# Patient Record
Sex: MALE | Race: WHITE | Employment: FULL TIME | URBAN - METROPOLITAN AREA
[De-identification: names, ages, dates, MRNs, and addresses within clinical notes are randomized per-mention and may not be internally consistent; named-entity substitution may affect disease eponyms.]

---

## 2018-08-08 ENCOUNTER — OFFICE VISIT (OUTPATIENT)
Dept: SURGERY | Age: 43
End: 2018-08-08

## 2018-08-08 VITALS
DIASTOLIC BLOOD PRESSURE: 84 MMHG | OXYGEN SATURATION: 97 % | RESPIRATION RATE: 18 BRPM | HEIGHT: 71 IN | HEART RATE: 89 BPM | WEIGHT: 245 LBS | TEMPERATURE: 98.3 F | BODY MASS INDEX: 34.3 KG/M2 | SYSTOLIC BLOOD PRESSURE: 130 MMHG

## 2018-08-08 DIAGNOSIS — K57.32 DIVERTICULITIS OF COLON: Primary | ICD-10-CM

## 2018-08-08 RX ORDER — FENOFIBRATE 145 MG/1
TABLET, COATED ORAL DAILY
COMMUNITY

## 2018-08-08 RX ORDER — ESZOPICLONE 3 MG/1
3 TABLET, FILM COATED ORAL
COMMUNITY
End: 2018-10-08

## 2018-08-08 NOTE — PROGRESS NOTES
Surgery Consult    Subjective:        Rere Green is a 37 y.o.  male who was referred by Dr April Charles for evaluation of diverticulitis. He has had multiple attacks of diverticulitis, usually requiring PO antibiotics. His last attack was severe enough to require hospitalization for a micro-perforated diverticulitis. He is ready to have that section of his intestine removed just like his mother and sister had done for diverticulitis. Chief Complaint   Patient presents with   [facility] Follow Up     Recently in [facility] for 'ruptured diverticulitis'       Patient Active Problem List    Diagnosis Date Noted    Diverticulitis of colon 08/08/2018     Past Medical History:   Diagnosis Date    Acid indigestion     Back pain     H/O diverticulitis of colon     Joint pain     and stiffness      No past surgical history on file. Social History   Substance Use Topics    Smoking status: Current Every Day Smoker    Smokeless tobacco: Never Used    Alcohol use No      Family History   Problem Relation Age of Onset    Cancer Mother       Current Outpatient Prescriptions   Medication Sig    ibuprofen (MOTRIN PO) Take  by mouth. '800mg'    fenofibrate nanocrystallized (TRICOR) 145 mg tablet Take  by mouth daily.  eszopiclone (LUNESTA) 3 mg tablet Take  by mouth nightly.  OMEPRAZOLE PO Take  by mouth. No current facility-administered medications for this visit. No Known Allergies     Review of Systems:    A comprehensive review of systems was negative except for that written in the History of Present Illness.     Objective:     Visit Vitals    /84 (BP 1 Location: Left arm, BP Patient Position: Sitting)    Pulse 89    Temp 98.3 °F (36.8 °C) (Oral)    Resp 18    Ht 5' 11\" (1.803 m)    Wt 245 lb (111.1 kg)    SpO2 97%    BMI 34.17 kg/m2         Physical Exam:  General appearance: alert, cooperative, no distress, appears stated age  Head: Normocephalic, without obvious abnormality, atraumatic  Lungs: clear to auscultation bilaterally  Heart: regular rate and rhythm, S1, S2 normal, no murmur, click, rub or gallop  Neurologic: Grossly normal  Abdomen: soft, and there is no tenderness even in the LLQ at this time. Assessment:       ICD-10-CM ICD-9-CM    1. Diverticulitis of colon K57.32 562.11          Plan:     Sigmoid resection would be a good idea. He has seen Dr Jacquie Hicks who raised the question of a colonoscopy to clear the rest of the colon before proceeding with a surgical resection. I think that is a good idea. Dr Jacquie Hicks wants to let his diverticulitis cool off before performing a colonoscopy. Once that is accomplished, we will proceed with a laparoscopic sigmoid resection with ERAS protocol. Written by frank Shanks, as dictated by Hong Jenkins MD.    Total face to face time with patient: 20 minutes. Greater than 50% of the time was spent in counseling. Signed By: Hong Jenkins MD     August 8, 2018

## 2018-08-08 NOTE — PROGRESS NOTES
1. Have you been to the ER, urgent care clinic since your last visit? Hospitalized since your last visit? Joey Robbins 'ruptured intestines'    2. Have you seen or consulted any other health care providers outside of the [address] [place] [address] since your last visit? Include any pap smears or colon screening.  Storm Xiao PCP and Dr. Savage Nixon GI-6/18/18

## 2018-08-08 NOTE — MR AVS SNAPSHOT
[address] [facility] [address] [address] 
[phone] Patient: Lane Back MRN: [id number] Visit Information Date & Time Provider Department Dept. Phone Encounter #  
 8/8/2018 11:40 AM Royal Roberto, [address] [phone] [id number] Upcoming Health Maintenance Date Due Pneumococcal 19-64 Medium Risk (1 of 1 - PPSV23) 5/17/1992 DTaP/Tdap/Td series (1 - Tdap) 5/17/1994 Influenza Age 5 to Adult 8/1/2018 Allergies as of 8/8/2018  Review Complete On: 8/8/2018 By: Royal Roberto MD  
 No Known Allergies Current Immunizations  Never Reviewed No immunizations on file. Not reviewed this visit You Were Diagnosed With   
  
 Codes Comments Diverticulitis of colon    -  Primary ICD-10-CM: K57.32 
ICD-9-CM: 562.11 Vitals BP Pulse Temp Resp Height(growth percentile) Weight(growth percentile) 130/84 (BP 1 Location: Left arm, BP Patient Position: Sitting) 89 98.3 °F (36.8 °C) (Oral) 18 5' 11\" (1.803 m) 245 lb (111.1 kg) SpO2 BMI Smoking Status 97% 34.17 kg/m2 Current Every Day Smoker Vitals History BMI and BSA Data Body Mass Index Body Surface Area  
 34.17 kg/m 2 2.36 m 2 Preferred Pharmacy Pharmacy Name Phone [address] [phone] Your Updated Medication List  
  
   
This list is accurate as of 8/8/18  5:11 PM.  Always use your most recent med list.  
  
  
  
  
 LUNESTA 3 mg tablet Generic drug:  eszopiclone Take  by mouth nightly. MOTRIN PO Take  by mouth. '800mg' OMEPRAZOLE PO Take  by mouth. TRICOR 145 mg tablet Generic drug:  fenofibrate nanocrystallized Take  by mouth daily. Introducing [facility] & HEALTH SERVICES!    
 Mary Mckeon introduces Fort[employer] patient portal. Now you can access parts of your medical record, email your doctor's office, and request medication refills online. 1. In your internet browser, go to [email or web address] 2. Click on the First Time User? Click Here link in the Sign In box. You will see the New Member Sign Up page. 3. Enter your [employer] Access Code exactly as it appears below. You will not need to use this code after youve completed the sign-up process. If you do not sign up before the expiration date, you must request a new code. · [employer] Access Code: [email or web address] Expires: 11/6/2018 11:17 AM 
 
4. Enter the last four digits of your Social Security Number (xxxx) and Date of Birth (mm/dd/yyyy) as indicated and click Submit. You will be taken to the next sign-up page. 5. Create a AuctionPay ID. This will be your AuctionPay login ID and cannot be changed, so think of one that is secure and easy to remember. 6. Create a AuctionPay password. You can change your password at any time. 7. Enter your Password Reset Question and Answer. This can be used at a later time if you forget your password. 8. Enter your e-mail address. You will receive e-mail notification when new information is available in [address] 9. Click Sign Up. You can now view and download portions of your medical record. 10. Click the Download Summary menu link to download a portable copy of your medical information. If you have questions, please visit the Frequently Asked Questions section of the [employer] website. Remember, AuctionPay is NOT to be used for urgent needs. For medical emergencies, dial 911. Now available from your iPhone and Android! Please provide this summary of care documentation to your next provider. Your primary care clinician is listed as NONE. If you have any questions after today's visit, please call [phone].

## 2018-08-31 ENCOUNTER — HOSPITAL ENCOUNTER (OUTPATIENT)
Dept: CT IMAGING | Age: 43
Discharge: HOME | End: 2018-08-31
Attending: SPECIALIST
Payer: OTHER GOVERNMENT

## 2018-08-31 ENCOUNTER — TELEPHONE (OUTPATIENT)
Dept: SURGERY | Age: 43
End: 2018-08-31

## 2018-08-31 DIAGNOSIS — K57.92 DIVERTICULITIS: ICD-10-CM

## 2018-08-31 DIAGNOSIS — R10.30 LOWER ABDOMINAL PAIN: ICD-10-CM

## 2018-08-31 PROCEDURE — 74011636320 HC RX REV CODE- 636/320: Performed by: SPECIALIST

## 2018-08-31 PROCEDURE — 74177 CT ABD & PELVIS W/CONTRAST: CPT

## 2018-08-31 PROCEDURE — 74011000258 HC RX REV CODE- 258: Performed by: SPECIALIST

## 2018-08-31 RX ORDER — SODIUM CHLORIDE 0.9 % (FLUSH) 0.9 %
10 SYRINGE (ML) INJECTION
Status: COMPLETED | OUTPATIENT
Start: 2018-08-31 | End: 2018-08-31

## 2018-08-31 RX ADMIN — IOHEXOL 50 ML: 240 INJECTION, SOLUTION INTRATHECAL; INTRAVASCULAR; INTRAVENOUS; ORAL at 16:54

## 2018-08-31 RX ADMIN — SODIUM CHLORIDE 100 ML: 900 INJECTION, SOLUTION INTRAVENOUS at 16:54

## 2018-08-31 RX ADMIN — IOPAMIDOL 100 ML: 755 INJECTION, SOLUTION INTRAVENOUS at 16:54

## 2018-08-31 RX ADMIN — Medication 10 ML: at 16:54

## 2018-08-31 NOTE — TELEPHONE ENCOUNTER
I returned patients call and he said he has not heard back from us about scheduling the surgery. I found in his chart he is supposed to have a colonoscopy with Dr. Julissa Castrejon on 9/27/18. He said we were supposed to be   getting the records from [facility] about his recent stay in the hospital with Diverticulitis. I see Dr. Pau Cowart had mentioned in his note that the patient will be having the ERAS surgery. I asked patient if he received any info specifically on the ERAS surgery and he said he did not think so. I suggested he make a F/U appt. after the colonoscopy , to go ever records from [facility] and to discuss in detail the ERAS surgery and well as the prep, prescriptions etc for that and to go over results of colonoscopy. Call was transferred up front for scheduling appt.

## 2018-09-26 ENCOUNTER — ANESTHESIA EVENT (OUTPATIENT)
Dept: ENDOSCOPY | Age: 43
End: 2018-09-26
Payer: OTHER GOVERNMENT

## 2018-09-26 NOTE — ANESTHESIA PREPROCEDURE EVALUATION
Anesthetic History No history of anesthetic complications Review of Systems / Medical History Patient summary reviewed, nursing notes reviewed and pertinent labs reviewed Pulmonary Within defined limits Neuro/Psych Within defined limits Cardiovascular Hyperlipidemia GI/Hepatic/Renal 
  
GERD: well controlled Endo/Other Obesity Other Findings Physical Exam 
 
Airway Mallampati: III 
TM Distance: > 6 cm Neck ROM: normal range of motion Mouth opening: Normal 
 
 Cardiovascular Regular rate and rhythm,  S1 and S2 normal,  no murmur, click, rub, or gallop Dental 
No notable dental hx Pulmonary Breath sounds clear to auscultation Abdominal 
GI exam deferred Other Findings Anesthetic Plan ASA: 2 Anesthesia type: MAC Induction: Intravenous Anesthetic plan and risks discussed with: Patient

## 2018-09-27 ENCOUNTER — ANESTHESIA (OUTPATIENT)
Dept: ENDOSCOPY | Age: 43
End: 2018-09-27
Payer: OTHER GOVERNMENT

## 2018-09-27 ENCOUNTER — HOSPITAL ENCOUNTER (OUTPATIENT)
Age: 43
Setting detail: OUTPATIENT SURGERY
Discharge: HOME | End: 2018-09-27
Attending: SPECIALIST | Admitting: SPECIALIST
Payer: OTHER GOVERNMENT

## 2018-09-27 VITALS
RESPIRATION RATE: 14 BRPM | DIASTOLIC BLOOD PRESSURE: 78 MMHG | TEMPERATURE: 98.2 F | HEART RATE: 65 BPM | OXYGEN SATURATION: 95 % | SYSTOLIC BLOOD PRESSURE: 116 MMHG

## 2018-09-27 PROCEDURE — 77030013992 HC SNR POLYP ENDOSC BSC -B: Performed by: SPECIALIST

## 2018-09-27 PROCEDURE — 76060000032 HC ANESTHESIA 0.5 TO 1 HR: Performed by: SPECIALIST

## 2018-09-27 PROCEDURE — 74011250636 HC RX REV CODE- 250/636

## 2018-09-27 PROCEDURE — 88305 TISSUE EXAM BY PATHOLOGIST: CPT | Performed by: SPECIALIST

## 2018-09-27 PROCEDURE — 76040000007: Performed by: SPECIALIST

## 2018-09-27 PROCEDURE — 77030027957 HC TBNG IRR ENDOGTR BUSS -B: Performed by: SPECIALIST

## 2018-09-27 PROCEDURE — 77030009426 HC FCPS BIOP ENDOSC BSC -B: Performed by: SPECIALIST

## 2018-09-27 PROCEDURE — 77030003657 HC NDL SCLER BSC -B: Performed by: SPECIALIST

## 2018-09-27 RX ORDER — PROPOFOL 10 MG/ML
INJECTION, EMULSION INTRAVENOUS AS NEEDED
Status: DISCONTINUED | OUTPATIENT
Start: 2018-09-27 | End: 2018-09-27 | Stop reason: HOSPADM

## 2018-09-27 RX ORDER — ATROPINE SULFATE 0.1 MG/ML
0.5 INJECTION INTRAVENOUS
Status: DISCONTINUED | OUTPATIENT
Start: 2018-09-27 | End: 2018-09-27 | Stop reason: HOSPADM

## 2018-09-27 RX ORDER — SODIUM CHLORIDE 9 MG/ML
INJECTION, SOLUTION INTRAVENOUS
Status: DISCONTINUED | OUTPATIENT
Start: 2018-09-27 | End: 2018-09-27 | Stop reason: HOSPADM

## 2018-09-27 RX ORDER — SODIUM CHLORIDE 0.9 % (FLUSH) 0.9 %
5-10 SYRINGE (ML) INJECTION EVERY 8 HOURS
Status: DISCONTINUED | OUTPATIENT
Start: 2018-09-27 | End: 2018-09-27 | Stop reason: HOSPADM

## 2018-09-27 RX ORDER — NALOXONE HYDROCHLORIDE 0.4 MG/ML
0.4 INJECTION, SOLUTION INTRAMUSCULAR; INTRAVENOUS; SUBCUTANEOUS
Status: DISCONTINUED | OUTPATIENT
Start: 2018-09-27 | End: 2018-09-27 | Stop reason: HOSPADM

## 2018-09-27 RX ORDER — FENTANYL CITRATE 50 UG/ML
200 INJECTION, SOLUTION INTRAMUSCULAR; INTRAVENOUS
Status: DISCONTINUED | OUTPATIENT
Start: 2018-09-27 | End: 2018-09-27 | Stop reason: HOSPADM

## 2018-09-27 RX ORDER — SODIUM CHLORIDE 9 MG/ML
50 INJECTION, SOLUTION INTRAVENOUS CONTINUOUS
Status: DISCONTINUED | OUTPATIENT
Start: 2018-09-27 | End: 2018-09-27 | Stop reason: HOSPADM

## 2018-09-27 RX ORDER — MIDAZOLAM HYDROCHLORIDE 1 MG/ML
.25-1 INJECTION, SOLUTION INTRAMUSCULAR; INTRAVENOUS
Status: DISCONTINUED | OUTPATIENT
Start: 2018-09-27 | End: 2018-09-27 | Stop reason: HOSPADM

## 2018-09-27 RX ORDER — FLUMAZENIL 0.1 MG/ML
0.2 INJECTION INTRAVENOUS
Status: DISCONTINUED | OUTPATIENT
Start: 2018-09-27 | End: 2018-09-27 | Stop reason: HOSPADM

## 2018-09-27 RX ORDER — EPINEPHRINE 0.1 MG/ML
1 INJECTION INTRACARDIAC; INTRAVENOUS
Status: DISCONTINUED | OUTPATIENT
Start: 2018-09-27 | End: 2018-09-27 | Stop reason: HOSPADM

## 2018-09-27 RX ORDER — DEXTROMETHORPHAN/PSEUDOEPHED 2.5-7.5/.8
1.2 DROPS ORAL
Status: DISCONTINUED | OUTPATIENT
Start: 2018-09-27 | End: 2018-09-27 | Stop reason: HOSPADM

## 2018-09-27 RX ORDER — SODIUM CHLORIDE 0.9 % (FLUSH) 0.9 %
5-10 SYRINGE (ML) INJECTION AS NEEDED
Status: DISCONTINUED | OUTPATIENT
Start: 2018-09-27 | End: 2018-09-27 | Stop reason: HOSPADM

## 2018-09-27 RX ADMIN — PROPOFOL 70 MG: 10 INJECTION, EMULSION INTRAVENOUS at 16:28

## 2018-09-27 RX ADMIN — PROPOFOL 50 MG: 10 INJECTION, EMULSION INTRAVENOUS at 16:37

## 2018-09-27 RX ADMIN — PROPOFOL 30 MG: 10 INJECTION, EMULSION INTRAVENOUS at 16:32

## 2018-09-27 RX ADMIN — SODIUM CHLORIDE: 9 INJECTION, SOLUTION INTRAVENOUS at 16:18

## 2018-09-27 RX ADMIN — PROPOFOL 50 MG: 10 INJECTION, EMULSION INTRAVENOUS at 16:34

## 2018-09-27 RX ADMIN — PROPOFOL 50 MG: 10 INJECTION, EMULSION INTRAVENOUS at 16:36

## 2018-09-27 RX ADMIN — SODIUM CHLORIDE: 9 INJECTION, SOLUTION INTRAVENOUS at 16:10

## 2018-09-27 NOTE — DISCHARGE INSTRUCTIONS
Trudy Vaughan  [id number]  1975    COLON DISCHARGE INSTRUCTIONS  Discomfort:  Redness at IV site- apply warm compress to area; if redness or soreness persist- contact your physician  There may be a slight amount of blood passed from the rectum  Gaseous discomfort- walking, belching will help relieve any discomfort  You may not operate a vehicle for 12 hours  You may not engage in an occupation involving machinery or appliances for rest of today  You may not drink alcoholic beverages for at least 12 hours  Avoid making any critical decisions for at least 24 hour  DIET:   Regular diet. - however -  remember your colon is empty and a heavy meal will produce gas. Avoid these foods:  vegetables, fried / greasy foods, carbonated drinks for today. MEDICATIONS:      Regarding Aspirin or Nonsteroidal medications, please see below. ACTIVITY:  You may resume your normal daily activities it is recommended that you spend the remainder of the day resting -  avoid any strenuous activity. CALL M.D. ANY SIGN OF:  Increasing pain, nausea, vomiting  Abdominal distension (swelling)  New increased bleeding (oral or rectal)  Fever (chills)  Pain in chest area  Bloody discharge from nose or mouth  Shortness of breath    You may not  take any Advil, Aspirin, Ibuprofen, Motrin, Aleve, or Goodys for 10 days, ONLY  Tylenol as needed for pain.       Follow-up Instructions:   Call Dr. Mary Martinez  Results of procedure / biopsy in 10 days  Telephone #  [phone]      DISCHARGE SUMMARY from Nurse    The following personal items collected during your admission are returned to you:   Dental Appliance: Dental Appliances: None  Vision:    Hearing Aid:    Jewelry:    Clothing:    Other Valuables:    Valuables sent to safe:

## 2018-09-27 NOTE — ANESTHESIA POSTPROCEDURE EVALUATION
Post-Anesthesia Evaluation and Assessment Patient: Sherri Mcdonald MRN: [id number]  SSN: [id number] YOB: 1975  Age: 37 y.o. Sex: male Cardiovascular Function/Vital Signs Visit Vitals  /56  Pulse 76  Temp 36.8 °C (98.2 °F)  Resp 18  SpO2 94% Patient is status post MAC anesthesia for Procedure(s): 
COLONOSCOPY 
ENDOSCOPIC POLYPECTOMY INJECTION. Nausea/Vomiting: None Postoperative hydration reviewed and adequate. Pain: 
Pain Scale 1: Numeric (0 - 10) (09/27/18 1510) Pain Intensity 1: 0 (09/27/18 1510) Managed Neurological Status: At baseline Mental Status and Level of Consciousness: Arousable Pulmonary Status:  
O2 Device: CO2 nasal cannula (09/27/18 1701) Adequate oxygenation and airway patent Complications related to anesthesia: None Post-anesthesia assessment completed. No concerns Signed By: Stas Turner DO September 27, 2018

## 2018-09-27 NOTE — IP AVS SNAPSHOT
[address] [facility] [address] [place] [address] 
[phone] Patient: Liyah Keith MRN: [id number] About your hospitalization You were admitted on:  September 27, 2018 You last received care in the:  [facility] ENDOSCOPY You were discharged on:  September 27, 2018 Why you were hospitalized Your primary diagnosis was:  Not on File Follow-up Information None Your Scheduled Appointments Monday October 01, 2018  1:20 PM EDT  
ESTABLISHED PATIENT with MD Shaina Mcqueen [phone] ([address] [facility]) [address] [facility] [address] [address]  
[phone] Discharge Orders None A check kelli indicates which time of day the medication should be taken. My Medications CONTINUE taking these medications Instructions Each Dose to Equal  
 Morning Noon Evening Bedtime LUNESTA 3 mg tablet Generic drug:  eszopiclone Your last dose was: Your next dose is: Take  by mouth nightly. MOTRIN PO Your last dose was: Your next dose is: Take  by mouth. '800mg' OMEPRAZOLE PO Your last dose was: Your next dose is: Take  by mouth. TRICOR 145 mg tablet Generic drug:  fenofibrate nanocrystallized Your last dose was: Your next dose is: Take  by mouth daily. Discharge Instructions Liyah Keith 
[id number] 1975 COLON DISCHARGE INSTRUCTIONS Discomfort: 
Redness at IV site- apply warm compress to area; if redness or soreness persist- contact your physician There may be a slight amount of blood passed from the rectum Gaseous discomfort- walking, belching will help relieve any discomfort You may not operate a vehicle for 12 hours You may not engage in an occupation involving machinery or appliances for rest of today You may not drink alcoholic beverages for at least 12 hours Avoid making any critical decisions for at least 24 hour DIET: 
 Regular diet.  however -  remember your colon is empty and a heavy meal will produce gas. Avoid these foods:  vegetables, fried / greasy foods, carbonated drinks for today. MEDICATIONS: 
  
 Regarding Aspirin or Nonsteroidal medications, please see below. ACTIVITY: 
You may resume your normal daily activities it is recommended that you spend the remainder of the day resting -  avoid any strenuous activity. CALL M.D. ANY SIGN OF: Increasing pain, nausea, vomiting Abdominal distension (swelling) New increased bleeding (oral or rectal) Fever (chills) Pain in chest area Bloody discharge from nose or mouth Shortness of breath You may not  take any Advil, Aspirin, Ibuprofen, Motrin, Aleve, or Goodys for 10 days, ONLY  Tylenol as needed for pain. Follow-up Instructions: 
 Call Dr. Nathan Duncan Results of procedure / biopsy in 10 days Telephone #  [phone] DISCHARGE SUMMARY from Nurse The following personal items collected during your admission are returned to you:  
Dental Appliance: Dental Appliances: None Vision:   
Hearing Aid:   
Jewelry:   
Clothing:   
Other Valuables:   
Valuables sent to safe:   
 
 
 
 
  
  
  
Introducing [facility] & HEALTH SERVICES! Skinny Guzman introduces [employer] patient portal. Now you can access parts of your medical record, email your doctor's office, and request medication refills online. 1. In your internet browser, go to [email or web address] 2. Click on the First Time User? Click Here link in the Sign In box. You will see the New Member Sign Up page. 3. Enter your Ref[employer]ktion Access Code exactly as it appears below. You will not need to use this code after youve completed the sign-up process.  If you do not sign up before the expiration date, you must request a new code. · [employer] Access Code: [email or web address] Expires: 11/6/2018 11:17 AM 
 
4. Enter the last four digits of your Social Security Number (xxxx) and Date of Birth (mm/dd/yyyy) as indicated and click Submit. You will be taken to the next sign-up page. 5. Create a [employer] ID. This will be your [employer] login ID and cannot be changed, so think of one that is secure and easy to remember. 6. Create a [employer] password. You can change your password at any time. 7. Enter your Password Reset Question and Answer. This can be used at a later time if you forget your password. 8. Enter your e-mail address. You will receive e-mail notification when new information is available in [address] 9. Click Sign Up. You can now view and download portions of your medical record. 10. Click the Download Summary menu link to download a portable copy of your medical information. If you have questions, please visit the Frequently Asked Questions section of the [employer] website. Remember, [employer] is NOT to be used for urgent needs. For medical emergencies, dial 911. Now available from your iPhone and Android! Introducing Zhen Abel As a [facility] patient, I wanted to make you aware of our electronic visit tool called Zhen Abel. Marely Gill 24/7 allows you to connect within minutes with a medical provider 24 hours a day, seven days a week via a mobile device or tablet or logging into a secure website from your computer. You can access Zhen Abel from anywhere in the [place].  
 
A virtual visit might be right for you when you have a simple condition and feel like you just dont want to get out of bed, or cant get away from work for an appointment, when your regular [facility] provider is not available (evenings, weekends or holidays), or when youre out of town and need minor care. Electronic visits cost only $49 and if the Soriano Rivas 24/Xtraice provider determines a prescription is needed to treat your condition, one can be electronically transmitted to a nearby pharmacy*. Please take a moment to enroll today if you have not already done so. The enrollment process is free and takes just a few minutes. To enroll, please download the Soriano Rivas 24/Xtraice rojelio to your tablet or phone, or visit [email or web address] to enroll on your computer. And, as an [address] [place] [address] patient with a Kanchufang account, the results of your visits will be scanned into your electronic medical record and your primary care provider will be able to view the scanned results. We urge you to continue to see your regular [facility] provider for your ongoing medical care. And while your primary care provider may not be the one available when you seek a Prismatic virtual visit, the peace of mind you get from getting a real diagnosis real time can be priceless. For more information on Prismatic, view our Frequently Asked Questions (FAQs) at [email or web address]. Sincerely, 
 
Jay Sepulveda MD 
Chief Medical Officer [facility] Jorge *:  certain medications cannot be prescribed via Prismatic Providers Seen During Your Hospitalization Provider Specialty Primary office phone Dieudonne Mendoza MD Gastroenterology [phone] Your Primary Care Physician (PCP) Primary Care Physician Office Phone Office Fax NONE ** None ** ** None ** You are allergic to the following No active allergies Recent Documentation Smoking Status Current Every Day Smoker Emergency Contacts Name Discharge Info Relation Home Work Mobile Jossy Chaudhary DISCHARGE CAREGIVER [3] Other Relative [6] [phone] Patient Belongings The following personal items are in your possession at time of discharge: 
  Dental Appliances: None Please provide this summary of care documentation to your next provider. Signatures-by signing, you are acknowledging that this After Visit Summary has been reviewed with you and you have received a copy. Patient Signature:  ____________________________________________________________ Date:  ____________________________________________________________  
  
[facility] Signature:  ____________________________________________________________ Date:  ____________________________________________________________

## 2018-09-27 NOTE — PROCEDURES
[address]  [address]                 Colonoscopy Procedure Note    Indications:   See Preoperative Diagnosis above  Referring Physician: None  Anesthesia/Sedation: MAC anesthesia Propofol  Endoscopist:  Dr. Rohit Martinez  Assistant:  Endoscopy Technician-1: Mirza Shook  Endoscopy RN-1: Riccardo Copeland RN  Preoperative diagnosis: GERD, DIVERTICULITIS  Postoperative diagnosis: 1. Diverticulosis  2. Colon Polyps    Procedure in Detail:  Informed consent was obtained for the procedure, including sedation. Risks of perforation, hemorrhage, adverse drug reaction, and aspiration were discussed. The patient was placed in the left lateral decubitus position. Based on the pre-procedure assessment, including review of the patient's medical history, medications, allergies, and review of systems, he had been deemed to be an appropriate candidate for moderate sedation; he was therefore sedated with the medications listed above. The patient was monitored continuously with ECG tracing, pulse oximetry, blood pressure monitoring, and direct observations. A rectal examination was performed. The [id number] was inserted into the rectum and advanced under direct vision to the cecum, which was identified by the ileocecal valve and appendiceal orifice. The quality of the colonic preparation was good. A careful inspection was made as the colonoscope was withdrawn, including a retroflexed view of the rectum; findings and interventions are described below. Appropriate photodocumentation was obtained. Findings:  Rectum: 3 mm polyp removed with cold biopsy  Sigmoid: moderate diverticulosis; Descending Colon: moderate diverticulosis;  Transverse Colon: 10 mm sessile polyp removed with hot snare after raising a submucosal pillow with 2 ml of normal saline injection.   Ascending Colon: normal  Cecum: normal    Specimens:    colon polyps    EBL: None    Complications: None; patient tolerated the procedure well. Recommendations:     - Await pathology. - If adenoma is present, repeat colonoscopy in 3 years.      - OK to proceed with colon resection from GI standpoint      Signed By: Ana Lewis MD                        September 27, 2018

## 2018-09-27 NOTE — H&P
Colonoscopy History and Physical 
 
 
The patient was seen and examined. Date of last colonoscopy: none, Polyps  No   
 
The airway was assessed and documented. The problem list, past medical history, and medications were reviewed. Patient Active Problem List  
Diagnosis Code  Diverticulitis of colon K57.32 Social History Social History  Marital status: SINGLE Spouse name: N/A  
 Number of children: N/A  
 Years of education: N/A Occupational History  Not on file. Social History Main Topics  Smoking status: Current Every Day Smoker  Smokeless tobacco: Never Used  Alcohol use No  
 Drug use: Not on file  Sexual activity: Not on file Other Topics Concern  Not on file Social History Narrative Past Medical History:  
Diagnosis Date  Acid indigestion  Back pain  H/O diverticulitis of colon  Joint pain   
 and stiffness Prior to Admission Medications Prescriptions Last Dose Informant Patient Reported? Taking? OMEPRAZOLE PO 9/25/2018  Yes No  
Sig: Take  by mouth.  
eszopiclone (LUNESTA) 3 mg tablet 9/20/2018 at Unknown time  Yes Yes Sig: Take  by mouth nightly. fenofibrate nanocrystallized (TRICOR) 145 mg tablet 9/25/2018  Yes No  
Sig: Take  by mouth daily. ibuprofen (MOTRIN PO) 9/25/2018  Yes No  
Sig: Take  by mouth. '800mg' Facility-Administered Medications: None The patient was seen and examined in the endoscopy suite. The airway was assessed and docuemented. The problem list and medications were reviewed. Chief complaint, history of present illness, and review of systems and Past medical History are positive for: Diverticulitis and GERD The heart, lungs and mental status were satisfactory for the administration of sedation and for the procedure. I discussed with the patient the objectives, risks, consequences and alternatives to the procedure. Plan: Endoscopic procedure with sedation Marti Renae MD  
9/27/2018 
4:29 PM

## 2018-09-27 NOTE — PROGRESS NOTES

## 2018-09-27 NOTE — IP AVS SNAPSHOT
Summary of Care Report The Summary of Care report has been created to help improve care coordination. Users with access to [employer] or [address] (Web-based application) may access additional patient information including the Discharge Summary. If you are not currently a [address] user and need more information, please call the number listed below in the [place] [address] section and ask to be connected with Medical Records. Facility Information Name Address Phone [address] [facility] 7 [address] [phone] Patient Information Patient Name Sex PRESTON Ardon ([id number]) Male 1975 Discharge Information Admitting Provider Service Area Unit Jose A Beckman MD /  [facility] Endoscopy / [phone] Discharge Provider Discharge Date/Time Discharge Disposition Destination (none) (none) (none) (none) Patient Language Language ENGLISH [13] Hospital Problems as of 2018  Reviewed: 2018  5:10 PM by Beni Veras MD  
 None Non-Hospital Problems as of 2018  Reviewed: 2018  5:10 PM by Beni Veras MD  
  
  
  
 Class Noted - Resolved Last Modified Active Problems Diverticulitis of colon  2018 - Present 2018 by Aleida Sims Entered by Aleida Sims You are allergic to the following No active allergies Current Discharge Medication List  
  
CONTINUE these medications which have NOT CHANGED Dose & Instructions Dispensing Information Comments LUNESTA 3 mg tablet Generic drug:  eszopiclone Take  by mouth nightly. Refills:  0 MOTRIN PO Take  by mouth. '800mg' Refills:  0  
   
 OMEPRAZOLE PO Take  by mouth. Refills:  0  
   
 TRICOR 145 mg tablet Generic drug:  fenofibrate nanocrystallized Take  by mouth daily. Refills:  0 Surgery Information ID Date/Time Status Primary Surgeon All Procedures Location [id number] 9/27/2018 1500 Unposted Chris Proctor MD COLONOSCOPY 
ENDOSCOPIC POLYPECTOMY INJECTION [facility] ENDOSCOPY Follow-up Information None Discharge Instructions Onofre Tony 
[id number] 1975 COLON DISCHARGE INSTRUCTIONS Discomfort: 
Redness at IV site- apply warm compress to area; if redness or soreness persist- contact your physician There may be a slight amount of blood passed from the rectum Gaseous discomfort- walking, belching will help relieve any discomfort You may not operate a vehicle for 12 hours You may not engage in an occupation involving machinery or appliances for rest of today You may not drink alcoholic beverages for at least 12 hours Avoid making any critical decisions for at least 24 hour DIET: 
 Regular diet.  however -  remember your colon is empty and a heavy meal will produce gas. Avoid these foods:  vegetables, fried / greasy foods, carbonated drinks for today. MEDICATIONS: 
  
 Regarding Aspirin or Nonsteroidal medications, please see below. ACTIVITY: 
You may resume your normal daily activities it is recommended that you spend the remainder of the day resting -  avoid any strenuous activity. CALL M.D. ANY SIGN OF: Increasing pain, nausea, vomiting Abdominal distension (swelling) New increased bleeding (oral or rectal) Fever (chills) Pain in chest area Bloody discharge from nose or mouth Shortness of breath You may not  take any Advil, Aspirin, Ibuprofen, Motrin, Aleve, or Goodys for 10 days, ONLY  Tylenol as needed for pain. Follow-up Instructions: 
 Call Dr. Becka Carrillo Results of procedure / biopsy in 10 days Telephone #  [phone] DISCHARGE SUMMARY from Nurse The following personal items collected during your admission are returned to you:  
Dental Appliance: Dental Appliances: None Vision:   
Hearing Aid:   
Jewelry:   
Clothing:   
Other Valuables:   
Valuables sent to safe:   
 
 
 
 
Chart Review Routing History No Routing History on File

## 2018-09-27 NOTE — ROUTINE PROCESS
Janice See 1975 
[id number] Situation: 
Verbal report received from: MAURO Salomon Procedure: Procedure(s): 
COLONOSCOPY 
ENDOSCOPIC POLYPECTOMY INJECTION Background: 
 
Preoperative diagnosis: GERD, DIVERTICULITIS Postoperative diagnosis: 1. Diverticulosis 2. Colon Polyps :  Dr. Germaine Green Assistant(s): Endoscopy Technician-1: Luis Angel Fox Endoscopy RN-1: Mellissa May RN Specimens:  
ID Type Source Tests Collected by Time Destination 1 : Transverse Colon Polyp Preservative   Will MD Pat 9/27/2018 1651 Pathology 2 : Rectal Polyp Preservative   Jose Stewart MD 9/27/2018 1655 Pathology H. Pylori  no Assessment: 
Intra-procedure medications Anesthesia gave intra-procedure sedation and medications, see anesthesia flow sheet yes Intravenous fluids:  300  NS @ [facility] Awkward Vital signs stable yes Abdominal assessment: round and soft yes Recommendation: 
Discharge patient per MD order yes. Return to floor no Family or Friend : friends Permission to share finding with family or friend no

## 2018-10-01 ENCOUNTER — OFFICE VISIT (OUTPATIENT)
Dept: SURGERY | Age: 43
End: 2018-10-01

## 2018-10-01 VITALS
BODY MASS INDEX: 34.02 KG/M2 | OXYGEN SATURATION: 98 % | HEART RATE: 93 BPM | DIASTOLIC BLOOD PRESSURE: 80 MMHG | TEMPERATURE: 98.1 F | HEIGHT: 71 IN | RESPIRATION RATE: 18 BRPM | SYSTOLIC BLOOD PRESSURE: 128 MMHG | WEIGHT: 243 LBS

## 2018-10-01 DIAGNOSIS — K57.32 DIVERTICULITIS OF COLON: Primary | ICD-10-CM

## 2018-10-01 RX ORDER — METRONIDAZOLE 500 MG/1
TABLET ORAL
Qty: 3 TAB | Refills: 0 | Status: SHIPPED | OUTPATIENT
Start: 2018-10-01 | End: 2018-10-22

## 2018-10-01 RX ORDER — METOCLOPRAMIDE 10 MG/1
TABLET ORAL
Qty: 3 TAB | Refills: 0 | Status: SHIPPED | OUTPATIENT
Start: 2018-10-01 | End: 2018-10-22

## 2018-10-01 RX ORDER — NEOMYCIN SULFATE 500 MG/1
TABLET ORAL
Qty: 6 TAB | Refills: 0 | Status: SHIPPED | OUTPATIENT
Start: 2018-10-01 | End: 2018-10-22

## 2018-10-01 RX ORDER — POLYETHYLENE GLYCOL 3350, SODIUM SULFATE ANHYDROUS, SODIUM BICARBONATE, SODIUM CHLORIDE, POTASSIUM CHLORIDE 236; 22.74; 6.74; 5.86; 2.97 G/4L; G/4L; G/4L; G/4L; G/4L
POWDER, FOR SOLUTION ORAL
Qty: 4000 ML | Refills: 0 | Status: SHIPPED | OUTPATIENT
Start: 2018-10-01 | End: 2018-10-22

## 2018-10-01 NOTE — PROGRESS NOTES
1. Have you been to the ER, urgent care clinic since your last visit? Hospitalized since your last visit? No 
 
2. Have you seen or consulted any other health care providers outside of the [address] [place] [address] since your last visit? Include any pap smears or colon screening. Dr. Jacinda Hernandez 9/27/18 Colonoscopy Patient C/O of cramping type pain since colonoscopy on 9/27/18, stating it is getting a little better.

## 2018-10-01 NOTE — MR AVS SNAPSHOT
[facility] [address] [address] 
[phone] Patient: Madison Churchill MRN: [id number] OGD:5/99/2307 Visit Information Date & Time Provider Department Dept. Phone Encounter #  
 10/1/2018  1:20 PM Bryce Sarabia, [address] [phone] [id number] Follow-up Instructions Routing History Upcoming Health Maintenance Date Due Pneumococcal 19-64 Medium Risk (1 of 1 - PPSV23) 5/17/1992 DTaP/Tdap/Td series (1 - Tdap) 5/17/1994 Influenza Age 5 to Adult 8/1/2018 Allergies as of 10/1/2018  Review Complete On: 10/1/2018 By: Schuyler Zuniga LPN No Known Allergies Current Immunizations  Never Reviewed No immunizations on file. Not reviewed this visit You Were Diagnosed With   
  
 Codes Comments Diverticulitis of colon    -  Primary ICD-10-CM: K57.32 
ICD-9-CM: 562.11 Vitals BP Pulse Temp Resp Height(growth percentile) Weight(growth percentile) 128/80 (BP 1 Location: Left arm, BP Patient Position: Sitting) 93 98.1 °F (36.7 °C) (Oral) 18 5' 11\" (1.803 m) 243 lb (110.2 kg) SpO2 BMI Smoking Status 98% 33.89 kg/m2 Current Every Day Smoker BMI and BSA Data Body Mass Index Body Surface Area  
 33.89 kg/m 2 2.35 m 2 Preferred Pharmacy Pharmacy Name Phone [facility] [phone] Your Updated Medication List  
  
   
This list is accurate as of 10/1/18  2:09 PM.  Always use your most recent med list.  
  
  
  
  
 LUNESTA 3 mg tablet Generic drug:  eszopiclone Take  by mouth nightly. metoclopramide HCl 10 mg tablet Commonly known as:  REGLAN One day prior to surgery take 1 tablet at 11AM, 5PM and 10PM  
  
 metroNIDAZOLE 500 mg tablet Commonly known as:  FLAGYL On the day before surgery take one tablet at 1PM, 2PM and 10PM  
  
 MOTRIN PO Take  by mouth. '800mg' neomycin 500 mg tablet Commonly known as:  Lysle Haynes On the day before surgery take 2 tablets at 1PM, 2PM and 10PM  
  
 OMEPRAZOLE PO Take  by mouth. PEG 3350-Electrolytes 236-22.74-6.74 -5.86 gram suspension Commonly known as:  GO-LYTELY On the day before surgery at 5PM start drinking 1000 ml every hour for 4 hours TRICOR 145 mg tablet Generic drug:  fenofibrate nanocrystallized Take  by mouth daily. Prescriptions Printed Refills  
 metroNIDAZOLE (FLAGYL) 500 mg tablet 0 Sig: On the day before surgery take one tablet at 1PM, 2PM and 10PM  
 Class: Print  
 metoclopramide HCl (REGLAN) 10 mg tablet 0 Sig: One day prior to surgery take 1 tablet at 11AM, 5PM and 10PM  
 Class: Print  
 neomycin (MYCIFRADIN) 500 mg tablet 0 Sig: On the day before surgery take 2 tablets at 1PM, 2PM and 10PM  
 Class: Print PEG 3350-Electrolytes (GO-LYTELY) 236-22.74-6.74 -5.86 gram suspension 0 Sig: On the day before surgery at 5PM start drinking 1000 ml every hour for 4 hours Class: Print Introducing [facility]! [facility] introduces [employer] patient portal. Now you can access parts of your medical record, email your doctor's office, and request medication refills online. 1. In your internet browser, go to [email or web address] 2. Click on the First Time User? Click Here link in the Sign In box. You will see the New Member Sign Up page. 3. Enter your [employer] Access Code exactly as it appears below. You will not need to use this code after youve completed the sign-up process. If you do not sign up before the expiration date, you must request a new code. · [employer] Access Code: [email or web address] Expires: 11/6/2018 11:17 AM 
 
4. Enter the last four digits of your Social Security Number (xxxx) and Date of Birth (mm/dd/yyyy) as indicated and click Submit. You will be taken to the next sign-up page. 5. Create a [employer] ID. This will be your [employer] login ID and cannot be changed, so think of one that is secure and easy to remember. 6. Create a [employer] password. You can change your password at any time. 7. Enter your Password Reset Question and Answer. This can be used at a later time if you forget your password. 8. Enter your e-mail address. You will receive e-mail notification when new information is available in [address] 9. Click Sign Up. You can now view and download portions of your medical record. 10. Click the Download Summary menu link to download a portable copy of your medical information. If you have questions, please visit the Frequently Asked Questions section of the [employer] website. Remember, [employer] is NOT to be used for urgent needs. For medical emergencies, dial 911. Now available from your iPhone and Android! Please provide this summary of care documentation to your next provider. Your primary care clinician is listed as NONE. If you have any questions after today's visit, please call [phone].

## 2018-10-01 NOTE — PROGRESS NOTES
Surgery History and Physical 
 
Subjective:  
  
Yann Virgen is a 37 y.o.  male who presents with his colonoscopy showing no other lesions of note save fir  His sigmoid diverticulitis. He is ready for resection. Patient Active Problem List  
 Diagnosis Date Noted  Diverticulitis of colon 08/08/2018 Past Medical History:  
Diagnosis Date  Acid indigestion  Back pain  H/O diverticulitis of colon  Joint pain   
 and stiffness Past Surgical History:  
Procedure Laterality Date  COLONOSCOPY N/A 9/27/2018 COLONOSCOPY performed by Giselle Gracia MD at [facility] ENDOSCOPY Social History Substance Use Topics  Smoking status: Current Every Day Smoker  Smokeless tobacco: Never Used  Alcohol use No  
  
Family History Problem Relation Age of Onset  Cancer Mother Prior to Admission medications Medication Sig Start Date End Date Taking? Authorizing Provider  
metroNIDAZOLE (FLAGYL) 500 mg tablet On the day before surgery take one tablet at 1PM, 2PM and 10PM 10/1/18  Yes Andrea Leal MD  
metoclopramide HCl (REGLAN) 10 mg tablet One day prior to surgery take 1 tablet at 11AM, 5PM and 10PM 10/1/18  Yes Andrea Leal MD  
[facility]) 500 mg tablet On the day before surgery take 2 tablets at [address] [facility], 2PM and 10PM 10/1/18  Yes Andrea Leal MD  
PEG 3350-Electrolytes (GO-LYTELY) 868-47.42-3.05 -5.86 gram suspension On the day before surgery at 5PM start drinking 1000 ml every hour for 4 hours 10/1/18  Yes Andrea Leal MD  
ibuprofen (MOTRIN PO) Take  by mouth. '800mg'   Yes Historical Provider  
fenofibrate nanocrystallized (TRICOR) 145 mg tablet Take  by mouth daily. Yes Historical Provider  
eszopiclone (LUNESTA) 3 mg tablet Take  by mouth nightly. Yes Historical Provider OMEPRAZOLE PO Take  by mouth. Yes Historical Provider No Known Allergies Review of Systems: A comprehensive review of systems was negative except for that written in the History of Present Illness. Objective:  
 
@IPVITALS[8:@ 
 
@UQKO[26@ Physical Exam: 
GENERAL: alert, cooperative, no distress, appears stated age, LYMPHATIC: Cervical, supraclavicular, and axillary nodes normal. , LUNG: clear to auscultation bilaterally, HEART: regular rate and rhythm, S1, S2 normal, no murmur, click, rub or gallop, ABDOMEN: soft, mildly tender in the left lower quadrant. Bowel sounds normal. No masses,  no organomegaly Labs: No results found for this or any previous visit (from the past 24 hour(s)). Assessment:  
 
Recurring episodes of diverticulitis . Sigmoid resection seems like a good idea and he agrees. Plan:  
 
Laparoscopic sigmoid resection with ERAS protocol Signed By: Gordy Harvey MD   
 October 1, 2018

## 2018-10-08 ENCOUNTER — HOSPITAL ENCOUNTER (OUTPATIENT)
Dept: GENERAL RADIOLOGY | Age: 43
Discharge: HOME | End: 2018-10-08
Payer: OTHER GOVERNMENT

## 2018-10-08 ENCOUNTER — HOSPITAL ENCOUNTER (OUTPATIENT)
Dept: PREADMISSION TESTING | Age: 43
Discharge: HOME | End: 2018-10-08
Payer: OTHER GOVERNMENT

## 2018-10-08 VITALS
DIASTOLIC BLOOD PRESSURE: 79 MMHG | HEIGHT: 71 IN | HEART RATE: 109 BPM | RESPIRATION RATE: 18 BRPM | BODY MASS INDEX: 34.33 KG/M2 | WEIGHT: 245.25 LBS | TEMPERATURE: 98.6 F | SYSTOLIC BLOOD PRESSURE: 119 MMHG

## 2018-10-08 DIAGNOSIS — Z01.818 PREOP EXAMINATION: ICD-10-CM

## 2018-10-08 LAB
ABO + RH BLD: NORMAL
ALBUMIN SERPL-MCNC: 3.5 G/DL (ref 3.5–5)
ALBUMIN/GLOB SERPL: 1.1 {RATIO} (ref 1.1–2.2)
ALP SERPL-CCNC: 58 U/L (ref 45–117)
ALT SERPL-CCNC: 32 U/L (ref 12–78)
ANION GAP SERPL CALC-SCNC: 11 MMOL/L (ref 5–15)
APTT PPP: 26.8 SEC (ref 22.1–32)
AST SERPL-CCNC: 19 U/L (ref 15–37)
BILIRUB SERPL-MCNC: 0.3 MG/DL (ref 0.2–1)
BLOOD GROUP ANTIBODIES SERPL: NORMAL
BUN SERPL-MCNC: 9 MG/DL (ref 6–20)
BUN/CREAT SERPL: 10 (ref 12–20)
CALCIUM SERPL-MCNC: 8.4 MG/DL (ref 8.5–10.1)
CHLORIDE SERPL-SCNC: 107 MMOL/L (ref 97–108)
CO2 SERPL-SCNC: 24 MMOL/L (ref 21–32)
CREAT SERPL-MCNC: 0.94 MG/DL (ref 0.7–1.3)
ERYTHROCYTE [DISTWIDTH] IN BLOOD BY AUTOMATED COUNT: 13.3 % (ref 11.5–14.5)
EST. AVERAGE GLUCOSE BLD GHB EST-MCNC: 111 MG/DL
GLOBULIN SER CALC-MCNC: 3.1 G/DL (ref 2–4)
GLUCOSE SERPL-MCNC: 135 MG/DL (ref 65–100)
HBA1C MFR BLD: 5.5 % (ref 4.2–6.3)
HCT VFR BLD AUTO: 44.3 % (ref 36.6–50.3)
HGB BLD-MCNC: 15.1 G/DL (ref 12.1–17)
INR PPP: 1 (ref 0.9–1.1)
MAGNESIUM SERPL-MCNC: 1.9 MG/DL (ref 1.6–2.4)
MCH RBC QN AUTO: 32.3 PG (ref 26–34)
MCHC RBC AUTO-ENTMCNC: 34.1 G/DL (ref 30–36.5)
MCV RBC AUTO: 94.7 FL (ref 80–99)
NRBC # BLD: 0 K/UL (ref 0–0.01)
NRBC BLD-RTO: 0 PER 100 WBC
PHOSPHATE SERPL-MCNC: 2.3 MG/DL (ref 2.6–4.7)
PLATELET # BLD AUTO: 338 K/UL (ref 150–400)
PMV BLD AUTO: 9.2 FL (ref 8.9–12.9)
POTASSIUM SERPL-SCNC: 3.6 MMOL/L (ref 3.5–5.1)
PROT SERPL-MCNC: 6.6 G/DL (ref 6.4–8.2)
PROTHROMBIN TIME: 9.9 SEC (ref 9–11.1)
RBC # BLD AUTO: 4.68 M/UL (ref 4.1–5.7)
SODIUM SERPL-SCNC: 142 MMOL/L (ref 136–145)
SPECIMEN EXP DATE BLD: NORMAL
THERAPEUTIC RANGE,PTTT: NORMAL SECS (ref 58–77)
WBC # BLD AUTO: 12.1 K/UL (ref 4.1–11.1)

## 2018-10-08 PROCEDURE — 83735 ASSAY OF MAGNESIUM: CPT | Performed by: SURGERY

## 2018-10-08 PROCEDURE — 85027 COMPLETE CBC AUTOMATED: CPT | Performed by: SURGERY

## 2018-10-08 PROCEDURE — 36415 COLL VENOUS BLD VENIPUNCTURE: CPT | Performed by: SURGERY

## 2018-10-08 PROCEDURE — 85610 PROTHROMBIN TIME: CPT | Performed by: SURGERY

## 2018-10-08 PROCEDURE — 85730 THROMBOPLASTIN TIME PARTIAL: CPT | Performed by: SURGERY

## 2018-10-08 PROCEDURE — 71046 X-RAY EXAM CHEST 2 VIEWS: CPT

## 2018-10-08 PROCEDURE — 83036 HEMOGLOBIN GLYCOSYLATED A1C: CPT | Performed by: SURGERY

## 2018-10-08 PROCEDURE — 86900 BLOOD TYPING SEROLOGIC ABO: CPT | Performed by: SURGERY

## 2018-10-08 PROCEDURE — 84100 ASSAY OF PHOSPHORUS: CPT | Performed by: SURGERY

## 2018-10-08 PROCEDURE — 93005 ELECTROCARDIOGRAM TRACING: CPT

## 2018-10-08 PROCEDURE — 80053 COMPREHEN METABOLIC PANEL: CPT | Performed by: SURGERY

## 2018-10-08 RX ORDER — OMEPRAZOLE 20 MG/1
20 CAPSULE, DELAYED RELEASE ORAL DAILY
COMMUNITY

## 2018-10-08 RX ORDER — IBUPROFEN 800 MG/1
800 TABLET ORAL
COMMUNITY

## 2018-10-08 RX ORDER — ESZOPICLONE 1 MG/1
1 TABLET, FILM COATED ORAL
COMMUNITY

## 2018-10-09 LAB
ATRIAL RATE: 98 BPM
CALCULATED P AXIS, ECG09: 43 DEGREES
CALCULATED R AXIS, ECG10: 63 DEGREES
CALCULATED T AXIS, ECG11: 24 DEGREES
DIAGNOSIS, 93000: NORMAL
P-R INTERVAL, ECG05: 166 MS
Q-T INTERVAL, ECG07: 364 MS
QRS DURATION, ECG06: 112 MS
QTC CALCULATION (BEZET), ECG08: 464 MS
VENTRICULAR RATE, ECG03: 98 BPM

## 2018-10-17 ENCOUNTER — ANESTHESIA EVENT (OUTPATIENT)
Dept: SURGERY | Age: 43
DRG: 330 | End: 2018-10-17
Payer: OTHER GOVERNMENT

## 2018-10-18 ENCOUNTER — ANESTHESIA (OUTPATIENT)
Dept: SURGERY | Age: 43
DRG: 330 | End: 2018-10-18
Payer: OTHER GOVERNMENT

## 2018-10-18 ENCOUNTER — HOSPITAL ENCOUNTER (INPATIENT)
Age: 43
LOS: 4 days | Discharge: HOME | DRG: 330 | End: 2018-10-22
Attending: SURGERY | Admitting: SURGERY
Payer: OTHER GOVERNMENT

## 2018-10-18 DIAGNOSIS — Z90.49 S/P PARTIAL COLECTOMY: Primary | ICD-10-CM

## 2018-10-18 DIAGNOSIS — R20.0 LEFT LEG NUMBNESS: ICD-10-CM

## 2018-10-18 DIAGNOSIS — R29.898 WEAKNESS OF LEFT FOOT: ICD-10-CM

## 2018-10-18 PROBLEM — K57.92 DIVERTICULITIS: Status: ACTIVE | Noted: 2018-10-18

## 2018-10-18 PROCEDURE — 77030026438 HC STYL ET INTUB CARD -A: Performed by: ANESTHESIOLOGY

## 2018-10-18 PROCEDURE — 77030008684 HC TU ET CUF COVD -B: Performed by: ANESTHESIOLOGY

## 2018-10-18 PROCEDURE — 74011250636 HC RX REV CODE- 250/636: Performed by: SURGERY

## 2018-10-18 PROCEDURE — 74011250636 HC RX REV CODE- 250/636

## 2018-10-18 PROCEDURE — 77030035051: Performed by: SURGERY

## 2018-10-18 PROCEDURE — 0DBN4ZZ EXCISION OF SIGMOID COLON, PERCUTANEOUS ENDOSCOPIC APPROACH: ICD-10-PCS | Performed by: SURGERY

## 2018-10-18 PROCEDURE — 76010000137 HC OR TIME 5 TO 5.5 HR: Performed by: SURGERY

## 2018-10-18 PROCEDURE — 76060000041 HC ANESTHESIA 5 TO 5.5 HR: Performed by: SURGERY

## 2018-10-18 PROCEDURE — 77030016151 HC PROTCTR LNS DFOG COVD -B: Performed by: SURGERY

## 2018-10-18 PROCEDURE — 74011000250 HC RX REV CODE- 250: Performed by: SURGERY

## 2018-10-18 PROCEDURE — P9045 ALBUMIN (HUMAN), 5%, 250 ML: HCPCS

## 2018-10-18 PROCEDURE — 77030009527 HC GEL PRT SYS AMR -E: Performed by: SURGERY

## 2018-10-18 PROCEDURE — 77030039266 HC ADH SKN EXOFIN S2SG -A: Performed by: SURGERY

## 2018-10-18 PROCEDURE — 77030013079 HC BLNKT BAIR HGGR 3M -A: Performed by: ANESTHESIOLOGY

## 2018-10-18 PROCEDURE — 77030010286 HC STPLR ENDOSC COVD -D: Performed by: SURGERY

## 2018-10-18 PROCEDURE — 77030012407 HC DRN WND BARD -B: Performed by: SURGERY

## 2018-10-18 PROCEDURE — 77030020263 HC SOL INJ SOD CL0.9% LFCR 1000ML: Performed by: SURGERY

## 2018-10-18 PROCEDURE — 74011250636 HC RX REV CODE- 250/636: Performed by: ANESTHESIOLOGY

## 2018-10-18 PROCEDURE — 77030025303 HC STPLR ENDOSC J&J -G: Performed by: SURGERY

## 2018-10-18 PROCEDURE — 77030013567 HC DRN WND RESERV BARD -A: Performed by: SURGERY

## 2018-10-18 PROCEDURE — 76210000016 HC OR PH I REC 1 TO 1.5 HR: Performed by: SURGERY

## 2018-10-18 PROCEDURE — 77030019908 HC STETH ESOPH SIMS -A: Performed by: ANESTHESIOLOGY

## 2018-10-18 PROCEDURE — 77030020747 HC TU INSUF ENDOSC TELE -A: Performed by: SURGERY

## 2018-10-18 PROCEDURE — 77030002933 HC SUT MCRYL J&J -A: Performed by: SURGERY

## 2018-10-18 PROCEDURE — 74011250637 HC RX REV CODE- 250/637: Performed by: SURGERY

## 2018-10-18 PROCEDURE — 77030035045 HC TRCR ENDOSC VRSPRT BLDLSS COVD -B: Performed by: SURGERY

## 2018-10-18 PROCEDURE — 74011000250 HC RX REV CODE- 250

## 2018-10-18 PROCEDURE — 77030035048 HC TRCR ENDOSC OPTCL COVD -B: Performed by: SURGERY

## 2018-10-18 PROCEDURE — 77030008771 HC TU NG SALEM SUMP -A: Performed by: ANESTHESIOLOGY

## 2018-10-18 PROCEDURE — 77030002996 HC SUT SLK J&J -A: Performed by: SURGERY

## 2018-10-18 PROCEDURE — 74011000258 HC RX REV CODE- 258: Performed by: SURGERY

## 2018-10-18 PROCEDURE — 77030034628 HC LIGASURE LAP SEAL DIV MD COVD -F: Performed by: SURGERY

## 2018-10-18 PROCEDURE — 77030009965 HC RELD STPLR ENDOS COVD -D: Performed by: SURGERY

## 2018-10-18 PROCEDURE — 88307 TISSUE EXAM BY PATHOLOGIST: CPT | Performed by: SURGERY

## 2018-10-18 PROCEDURE — 77030025242: Performed by: SURGERY

## 2018-10-18 PROCEDURE — 77030016154: Performed by: SURGERY

## 2018-10-18 PROCEDURE — 77030031139 HC SUT VCRL2 J&J -A: Performed by: SURGERY

## 2018-10-18 PROCEDURE — 77030019702 HC WRP THER MENM -C: Performed by: SURGERY

## 2018-10-18 PROCEDURE — 77030032490 HC SLV COMPR SCD KNE COVD -B: Performed by: SURGERY

## 2018-10-18 PROCEDURE — 65270000032 HC RM SEMIPRIVATE

## 2018-10-18 PROCEDURE — 77030034849: Performed by: SURGERY

## 2018-10-18 PROCEDURE — 74011250637 HC RX REV CODE- 250/637

## 2018-10-18 PROCEDURE — 77030038157 HC DEV PWR CNTR DISP SIGNIA COVD -C: Performed by: SURGERY

## 2018-10-18 PROCEDURE — 77030002986 HC SUT PROL J&J -A: Performed by: SURGERY

## 2018-10-18 PROCEDURE — 77030011640 HC PAD GRND REM COVD -A: Performed by: SURGERY

## 2018-10-18 RX ORDER — GABAPENTIN 300 MG/1
600 CAPSULE ORAL ONCE
Status: COMPLETED | OUTPATIENT
Start: 2018-10-18 | End: 2018-10-18

## 2018-10-18 RX ORDER — ACETAMINOPHEN 500 MG
1000 TABLET ORAL ONCE
Status: COMPLETED | OUTPATIENT
Start: 2018-10-18 | End: 2018-10-18

## 2018-10-18 RX ORDER — LIDOCAINE HYDROCHLORIDE ANHYDROUS AND DEXTROSE MONOHYDRATE .8; 5 G/100ML; G/100ML
1 INJECTION, SOLUTION INTRAVENOUS CONTINUOUS
Status: DISPENSED | OUTPATIENT
Start: 2018-10-18 | End: 2018-10-20

## 2018-10-18 RX ORDER — KETAMINE HYDROCHLORIDE 10 MG/ML
INJECTION, SOLUTION INTRAMUSCULAR; INTRAVENOUS AS NEEDED
Status: DISCONTINUED | OUTPATIENT
Start: 2018-10-18 | End: 2018-10-18 | Stop reason: HOSPADM

## 2018-10-18 RX ORDER — KETOROLAC TROMETHAMINE 30 MG/ML
15 INJECTION, SOLUTION INTRAMUSCULAR; INTRAVENOUS EVERY 6 HOURS
Status: DISCONTINUED | OUTPATIENT
Start: 2018-10-19 | End: 2018-10-19

## 2018-10-18 RX ORDER — ALBUTEROL SULFATE 90 UG/1
AEROSOL, METERED RESPIRATORY (INHALATION) AS NEEDED
Status: DISCONTINUED | OUTPATIENT
Start: 2018-10-18 | End: 2018-10-18 | Stop reason: HOSPADM

## 2018-10-18 RX ORDER — OXYCODONE HYDROCHLORIDE 5 MG/1
5 TABLET ORAL
Status: DISCONTINUED | OUTPATIENT
Start: 2018-10-18 | End: 2018-10-22 | Stop reason: HOSPADM

## 2018-10-18 RX ORDER — SODIUM CHLORIDE 0.9 % (FLUSH) 0.9 %
5-10 SYRINGE (ML) INJECTION AS NEEDED
Status: DISCONTINUED | OUTPATIENT
Start: 2018-10-18 | End: 2018-10-22 | Stop reason: HOSPADM

## 2018-10-18 RX ORDER — SODIUM CHLORIDE, SODIUM LACTATE, POTASSIUM CHLORIDE, CALCIUM CHLORIDE 600; 310; 30; 20 MG/100ML; MG/100ML; MG/100ML; MG/100ML
INJECTION, SOLUTION INTRAVENOUS
Status: DISCONTINUED | OUTPATIENT
Start: 2018-10-18 | End: 2018-10-18 | Stop reason: HOSPADM

## 2018-10-18 RX ORDER — MAGNESIUM SULFATE 1 G/100ML
INJECTION INTRAVENOUS AS NEEDED
Status: DISCONTINUED | OUTPATIENT
Start: 2018-10-18 | End: 2018-10-18 | Stop reason: HOSPADM

## 2018-10-18 RX ORDER — DIPHENHYDRAMINE HYDROCHLORIDE 50 MG/ML
INJECTION, SOLUTION INTRAMUSCULAR; INTRAVENOUS AS NEEDED
Status: DISCONTINUED | OUTPATIENT
Start: 2018-10-18 | End: 2018-10-18 | Stop reason: HOSPADM

## 2018-10-18 RX ORDER — GLYCOPYRROLATE 0.2 MG/ML
INJECTION INTRAMUSCULAR; INTRAVENOUS AS NEEDED
Status: DISCONTINUED | OUTPATIENT
Start: 2018-10-18 | End: 2018-10-18 | Stop reason: HOSPADM

## 2018-10-18 RX ORDER — NEOSTIGMINE METHYLSULFATE 1 MG/ML
INJECTION INTRAVENOUS AS NEEDED
Status: DISCONTINUED | OUTPATIENT
Start: 2018-10-18 | End: 2018-10-18 | Stop reason: HOSPADM

## 2018-10-18 RX ORDER — ONDANSETRON 2 MG/ML
INJECTION INTRAMUSCULAR; INTRAVENOUS AS NEEDED
Status: DISCONTINUED | OUTPATIENT
Start: 2018-10-18 | End: 2018-10-18 | Stop reason: HOSPADM

## 2018-10-18 RX ORDER — FENTANYL CITRATE 50 UG/ML
INJECTION, SOLUTION INTRAMUSCULAR; INTRAVENOUS AS NEEDED
Status: DISCONTINUED | OUTPATIENT
Start: 2018-10-18 | End: 2018-10-18 | Stop reason: HOSPADM

## 2018-10-18 RX ORDER — ONDANSETRON 4 MG/1
4 TABLET, ORALLY DISINTEGRATING ORAL
Status: DISCONTINUED | OUTPATIENT
Start: 2018-10-18 | End: 2018-10-22 | Stop reason: HOSPADM

## 2018-10-18 RX ORDER — ALVIMOPAN 12 MG/1
12 CAPSULE ORAL ONCE
Status: COMPLETED | OUTPATIENT
Start: 2018-10-18 | End: 2018-10-18

## 2018-10-18 RX ORDER — PROPOFOL 10 MG/ML
INJECTION, EMULSION INTRAVENOUS AS NEEDED
Status: DISCONTINUED | OUTPATIENT
Start: 2018-10-18 | End: 2018-10-18 | Stop reason: HOSPADM

## 2018-10-18 RX ORDER — ONDANSETRON 2 MG/ML
4 INJECTION INTRAMUSCULAR; INTRAVENOUS AS NEEDED
Status: DISCONTINUED | OUTPATIENT
Start: 2018-10-18 | End: 2018-10-18 | Stop reason: HOSPADM

## 2018-10-18 RX ORDER — DEXMEDETOMIDINE HYDROCHLORIDE 4 UG/ML
INJECTION, SOLUTION INTRAVENOUS AS NEEDED
Status: DISCONTINUED | OUTPATIENT
Start: 2018-10-18 | End: 2018-10-18 | Stop reason: HOSPADM

## 2018-10-18 RX ORDER — CELECOXIB 100 MG/1
100 CAPSULE ORAL 2 TIMES DAILY
Status: DISCONTINUED | OUTPATIENT
Start: 2018-10-19 | End: 2018-10-19

## 2018-10-18 RX ORDER — PHENYLEPHRINE HCL IN 0.9% NACL 0.4MG/10ML
SYRINGE (ML) INTRAVENOUS AS NEEDED
Status: DISCONTINUED | OUTPATIENT
Start: 2018-10-18 | End: 2018-10-18 | Stop reason: HOSPADM

## 2018-10-18 RX ORDER — SODIUM CHLORIDE, SODIUM LACTATE, POTASSIUM CHLORIDE, CALCIUM CHLORIDE 600; 310; 30; 20 MG/100ML; MG/100ML; MG/100ML; MG/100ML
75 INJECTION, SOLUTION INTRAVENOUS CONTINUOUS
Status: DISPENSED | OUTPATIENT
Start: 2018-10-18 | End: 2018-10-19

## 2018-10-18 RX ORDER — SODIUM CHLORIDE, SODIUM LACTATE, POTASSIUM CHLORIDE, CALCIUM CHLORIDE 600; 310; 30; 20 MG/100ML; MG/100ML; MG/100ML; MG/100ML
75 INJECTION, SOLUTION INTRAVENOUS CONTINUOUS
Status: DISCONTINUED | OUTPATIENT
Start: 2018-10-18 | End: 2018-10-18 | Stop reason: HOSPADM

## 2018-10-18 RX ORDER — CELECOXIB 200 MG/1
200 CAPSULE ORAL ONCE
Status: COMPLETED | OUTPATIENT
Start: 2018-10-18 | End: 2018-10-18

## 2018-10-18 RX ORDER — ACETAMINOPHEN 500 MG
1000 TABLET ORAL EVERY 6 HOURS
Status: DISCONTINUED | OUTPATIENT
Start: 2018-10-18 | End: 2018-10-22 | Stop reason: HOSPADM

## 2018-10-18 RX ORDER — SUCCINYLCHOLINE CHLORIDE 20 MG/ML
INJECTION INTRAMUSCULAR; INTRAVENOUS AS NEEDED
Status: DISCONTINUED | OUTPATIENT
Start: 2018-10-18 | End: 2018-10-18 | Stop reason: HOSPADM

## 2018-10-18 RX ORDER — MIDAZOLAM HYDROCHLORIDE 1 MG/ML
1 INJECTION, SOLUTION INTRAMUSCULAR; INTRAVENOUS AS NEEDED
Status: DISCONTINUED | OUTPATIENT
Start: 2018-10-18 | End: 2018-10-18 | Stop reason: HOSPADM

## 2018-10-18 RX ORDER — SODIUM CHLORIDE 0.9 % (FLUSH) 0.9 %
5-10 SYRINGE (ML) INJECTION EVERY 8 HOURS
Status: DISCONTINUED | OUTPATIENT
Start: 2018-10-18 | End: 2018-10-22 | Stop reason: HOSPADM

## 2018-10-18 RX ORDER — ALBUMIN HUMAN 50 G/1000ML
SOLUTION INTRAVENOUS AS NEEDED
Status: DISCONTINUED | OUTPATIENT
Start: 2018-10-18 | End: 2018-10-18 | Stop reason: HOSPADM

## 2018-10-18 RX ORDER — LIDOCAINE HYDROCHLORIDE 20 MG/ML
INJECTION, SOLUTION EPIDURAL; INFILTRATION; INTRACAUDAL; PERINEURAL
Status: DISCONTINUED | OUTPATIENT
Start: 2018-10-18 | End: 2018-10-18 | Stop reason: HOSPADM

## 2018-10-18 RX ORDER — HYDROMORPHONE HYDROCHLORIDE 2 MG/ML
1 INJECTION, SOLUTION INTRAMUSCULAR; INTRAVENOUS; SUBCUTANEOUS
Status: DISCONTINUED | OUTPATIENT
Start: 2018-10-18 | End: 2018-10-22 | Stop reason: HOSPADM

## 2018-10-18 RX ORDER — ACETAMINOPHEN 10 MG/ML
INJECTION, SOLUTION INTRAVENOUS AS NEEDED
Status: DISCONTINUED | OUTPATIENT
Start: 2018-10-18 | End: 2018-10-18 | Stop reason: HOSPADM

## 2018-10-18 RX ORDER — NALOXONE HYDROCHLORIDE 0.4 MG/ML
0.4 INJECTION, SOLUTION INTRAMUSCULAR; INTRAVENOUS; SUBCUTANEOUS AS NEEDED
Status: DISCONTINUED | OUTPATIENT
Start: 2018-10-18 | End: 2018-10-22 | Stop reason: HOSPADM

## 2018-10-18 RX ORDER — ROCURONIUM BROMIDE 10 MG/ML
INJECTION, SOLUTION INTRAVENOUS AS NEEDED
Status: DISCONTINUED | OUTPATIENT
Start: 2018-10-18 | End: 2018-10-18 | Stop reason: HOSPADM

## 2018-10-18 RX ORDER — LIDOCAINE HYDROCHLORIDE 20 MG/ML
INJECTION, SOLUTION EPIDURAL; INFILTRATION; INTRACAUDAL; PERINEURAL AS NEEDED
Status: DISCONTINUED | OUTPATIENT
Start: 2018-10-18 | End: 2018-10-18 | Stop reason: HOSPADM

## 2018-10-18 RX ORDER — ENOXAPARIN SODIUM 100 MG/ML
40 INJECTION SUBCUTANEOUS EVERY 24 HOURS
Status: DISCONTINUED | OUTPATIENT
Start: 2018-10-18 | End: 2018-10-22 | Stop reason: HOSPADM

## 2018-10-18 RX ORDER — DEXAMETHASONE SODIUM PHOSPHATE 4 MG/ML
INJECTION, SOLUTION INTRA-ARTICULAR; INTRALESIONAL; INTRAMUSCULAR; INTRAVENOUS; SOFT TISSUE AS NEEDED
Status: DISCONTINUED | OUTPATIENT
Start: 2018-10-18 | End: 2018-10-18 | Stop reason: HOSPADM

## 2018-10-18 RX ORDER — ENOXAPARIN SODIUM 100 MG/ML
40 INJECTION SUBCUTANEOUS EVERY 24 HOURS
Status: DISCONTINUED | OUTPATIENT
Start: 2018-10-18 | End: 2018-10-18 | Stop reason: SDUPTHER

## 2018-10-18 RX ORDER — MIDAZOLAM HYDROCHLORIDE 1 MG/ML
INJECTION, SOLUTION INTRAMUSCULAR; INTRAVENOUS AS NEEDED
Status: DISCONTINUED | OUTPATIENT
Start: 2018-10-18 | End: 2018-10-18 | Stop reason: HOSPADM

## 2018-10-18 RX ORDER — ALVIMOPAN 12 MG/1
12 CAPSULE ORAL 2 TIMES DAILY
Status: DISCONTINUED | OUTPATIENT
Start: 2018-10-19 | End: 2018-10-22 | Stop reason: HOSPADM

## 2018-10-18 RX ORDER — FENTANYL CITRATE 50 UG/ML
25 INJECTION, SOLUTION INTRAMUSCULAR; INTRAVENOUS
Status: COMPLETED | OUTPATIENT
Start: 2018-10-18 | End: 2018-10-18

## 2018-10-18 RX ADMIN — SUCCINYLCHOLINE CHLORIDE 180 MG: 20 INJECTION INTRAMUSCULAR; INTRAVENOUS at 13:09

## 2018-10-18 RX ADMIN — SODIUM CHLORIDE, SODIUM LACTATE, POTASSIUM CHLORIDE, AND CALCIUM CHLORIDE 75 ML/HR: 600; 310; 30; 20 INJECTION, SOLUTION INTRAVENOUS at 18:12

## 2018-10-18 RX ADMIN — DIPHENHYDRAMINE HYDROCHLORIDE 25 MG: 50 INJECTION, SOLUTION INTRAMUSCULAR; INTRAVENOUS at 13:44

## 2018-10-18 RX ADMIN — GABAPENTIN 600 MG: 300 CAPSULE ORAL at 10:39

## 2018-10-18 RX ADMIN — FENTANYL CITRATE 50 MCG: 50 INJECTION, SOLUTION INTRAMUSCULAR; INTRAVENOUS at 17:26

## 2018-10-18 RX ADMIN — SODIUM CHLORIDE, SODIUM LACTATE, POTASSIUM CHLORIDE, CALCIUM CHLORIDE: 600; 310; 30; 20 INJECTION, SOLUTION INTRAVENOUS at 17:52

## 2018-10-18 RX ADMIN — CELECOXIB 200 MG: 200 CAPSULE ORAL at 10:40

## 2018-10-18 RX ADMIN — ALBUMIN HUMAN 250 ML: 50 SOLUTION INTRAVENOUS at 17:02

## 2018-10-18 RX ADMIN — PROPOFOL 200 MG: 10 INJECTION, EMULSION INTRAVENOUS at 13:09

## 2018-10-18 RX ADMIN — GLYCOPYRROLATE 0.4 MG: 0.2 INJECTION INTRAMUSCULAR; INTRAVENOUS at 17:25

## 2018-10-18 RX ADMIN — FENTANYL CITRATE 25 MCG: 50 INJECTION INTRAMUSCULAR; INTRAVENOUS at 18:52

## 2018-10-18 RX ADMIN — DEXAMETHASONE SODIUM PHOSPHATE 8 MG: 4 INJECTION, SOLUTION INTRA-ARTICULAR; INTRALESIONAL; INTRAMUSCULAR; INTRAVENOUS; SOFT TISSUE at 13:22

## 2018-10-18 RX ADMIN — ALBUMIN HUMAN 250 ML: 50 SOLUTION INTRAVENOUS at 14:00

## 2018-10-18 RX ADMIN — ENOXAPARIN SODIUM 40 MG: 40 INJECTION SUBCUTANEOUS at 10:40

## 2018-10-18 RX ADMIN — ALBUTEROL SULFATE 3 PUFF: 90 AEROSOL, METERED RESPIRATORY (INHALATION) at 17:58

## 2018-10-18 RX ADMIN — LIDOCAINE HYDROCHLORIDE 1 MG/KG/HR: 8 INJECTION, SOLUTION INTRAVENOUS at 18:12

## 2018-10-18 RX ADMIN — CEFOTETAN DISODIUM 2 G: 2 INJECTION, POWDER, FOR SOLUTION INTRAMUSCULAR; INTRAVENOUS at 13:12

## 2018-10-18 RX ADMIN — MIDAZOLAM HYDROCHLORIDE 2 MG: 1 INJECTION, SOLUTION INTRAMUSCULAR; INTRAVENOUS at 12:48

## 2018-10-18 RX ADMIN — LIDOCAINE HYDROCHLORIDE 100 MG: 20 INJECTION, SOLUTION EPIDURAL; INFILTRATION; INTRACAUDAL; PERINEURAL at 13:08

## 2018-10-18 RX ADMIN — ROCURONIUM BROMIDE 20 MG: 10 INJECTION, SOLUTION INTRAVENOUS at 16:44

## 2018-10-18 RX ADMIN — Medication 80 MCG: at 13:25

## 2018-10-18 RX ADMIN — ROCURONIUM BROMIDE 20 MG: 10 INJECTION, SOLUTION INTRAVENOUS at 16:24

## 2018-10-18 RX ADMIN — KETAMINE HYDROCHLORIDE 50 MG: 10 INJECTION, SOLUTION INTRAMUSCULAR; INTRAVENOUS at 13:20

## 2018-10-18 RX ADMIN — ROCURONIUM BROMIDE 5 MG: 10 INJECTION, SOLUTION INTRAVENOUS at 13:09

## 2018-10-18 RX ADMIN — ALBUTEROL SULFATE 3 PUFF: 90 AEROSOL, METERED RESPIRATORY (INHALATION) at 17:53

## 2018-10-18 RX ADMIN — FENTANYL CITRATE 50 MCG: 50 INJECTION, SOLUTION INTRAMUSCULAR; INTRAVENOUS at 12:57

## 2018-10-18 RX ADMIN — FENTANYL CITRATE 50 MCG: 50 INJECTION, SOLUTION INTRAMUSCULAR; INTRAVENOUS at 13:07

## 2018-10-18 RX ADMIN — ONDANSETRON 4 MG: 2 INJECTION INTRAMUSCULAR; INTRAVENOUS at 17:25

## 2018-10-18 RX ADMIN — ROCURONIUM BROMIDE 20 MG: 10 INJECTION, SOLUTION INTRAVENOUS at 14:43

## 2018-10-18 RX ADMIN — NEOSTIGMINE METHYLSULFATE 4 MG: 1 INJECTION INTRAVENOUS at 17:25

## 2018-10-18 RX ADMIN — ACETAMINOPHEN 1000 MG: 500 TABLET ORAL at 10:40

## 2018-10-18 RX ADMIN — ACETAMINOPHEN 1000 MG: 500 TABLET ORAL at 23:38

## 2018-10-18 RX ADMIN — SODIUM CHLORIDE, SODIUM LACTATE, POTASSIUM CHLORIDE, CALCIUM CHLORIDE: 600; 310; 30; 20 INJECTION, SOLUTION INTRAVENOUS at 12:48

## 2018-10-18 RX ADMIN — DEXMEDETOMIDINE HYDROCHLORIDE 10 MCG: 4 INJECTION, SOLUTION INTRAVENOUS at 17:38

## 2018-10-18 RX ADMIN — MAGNESIUM SULFATE 2 G: 1 INJECTION INTRAVENOUS at 13:23

## 2018-10-18 RX ADMIN — DEXMEDETOMIDINE HYDROCHLORIDE 10 MCG: 4 INJECTION, SOLUTION INTRAVENOUS at 17:27

## 2018-10-18 RX ADMIN — ROCURONIUM BROMIDE 45 MG: 10 INJECTION, SOLUTION INTRAVENOUS at 13:20

## 2018-10-18 RX ADMIN — ROCURONIUM BROMIDE 10 MG: 10 INJECTION, SOLUTION INTRAVENOUS at 15:32

## 2018-10-18 RX ADMIN — LIDOCAINE HYDROCHLORIDE 18.8 ML/HR: 20 INJECTION, SOLUTION EPIDURAL; INFILTRATION; INTRACAUDAL; PERINEURAL at 13:12

## 2018-10-18 RX ADMIN — SODIUM CHLORIDE, SODIUM LACTATE, POTASSIUM CHLORIDE, CALCIUM CHLORIDE: 600; 310; 30; 20 INJECTION, SOLUTION INTRAVENOUS at 14:45

## 2018-10-18 RX ADMIN — ROCURONIUM BROMIDE 10 MG: 10 INJECTION, SOLUTION INTRAVENOUS at 15:53

## 2018-10-18 RX ADMIN — ROCURONIUM BROMIDE 20 MG: 10 INJECTION, SOLUTION INTRAVENOUS at 14:09

## 2018-10-18 RX ADMIN — FENTANYL CITRATE 50 MCG: 50 INJECTION, SOLUTION INTRAMUSCULAR; INTRAVENOUS at 16:51

## 2018-10-18 RX ADMIN — ALVIMOPAN 12 MG: 12 CAPSULE ORAL at 10:39

## 2018-10-18 RX ADMIN — DEXMEDETOMIDINE HYDROCHLORIDE 10 MCG: 4 INJECTION, SOLUTION INTRAVENOUS at 17:29

## 2018-10-18 RX ADMIN — ACETAMINOPHEN 1000 MG: 10 INJECTION, SOLUTION INTRAVENOUS at 17:35

## 2018-10-18 NOTE — PERIOP NOTES
TRANSFER - OUT REPORT: 
 
Verbal report given to Dianna(name) on Karli Wilson  being transferred to [facility](unit) for routine post - op Report consisted of patients Situation, Background, Assessment and  
Recommendations(SBAR). Time Pre op antibiotic given:1312 Anesthesia Stop time: 9323 Angel Present on Transfer to floor:yes Order for Angel on Chart:yes Discharge Prescriptions with Chart:no Information from the following report(s) Procedure Summary and Accordion was reviewed with the receiving nurse. Opportunity for questions and clarification was provided. Is the patient on 02? YES 
     L/Min 2 Is the patient on a monitor? NO Is the nurse transporting with the patient? NO Surgical Waiting Area notified of patient's transfer from PACU? NO The following personal items collected during your admission accompanied patient upon transfer:  
Dental Appliance:   
Vision:   
Hearing Aid:   
Jewelry:   
Clothing:   
Other Valuables:   
Valuables sent to safe:   
2 bags of clothing returned to pt, family called and given update and room number on cell phone

## 2018-10-18 NOTE — BRIEF OP NOTE
BRIEF OPERATIVE NOTE Date of Procedure: 10/18/2018 Preoperative Diagnosis: DIVERTICULITIS OF COLON Postoperative Diagnosis: DIVERTICULITIS OF COLON Procedure(s): LAPAROSCOPIC SIGMOID RESECTION (E R A S) Surgeon(s) and Role: Brice Millan MD - Primary Surgical Assistant: Lis King Surgical Staff: 
Circ-1: Elsi Calixto RN 
Circ-Relief: Lisha Askew Scrub Tech-Relief: Mango Lyon Scrub RN-1: Maceo Prader, RN Scrub RN-Relief: Pallavi Alvarez RN Surg Asst-1: Susan Fisher Surg Asst-2: Teja Herndon RN Surg Asst-Relief: Alejandro Osuna 
Event Time In Time Out Incision Start  Incision Close Anesthesia: General  
Estimated Blood Loss: minimal 
Specimens:  
ID Type Source Tests Collected by Time Destination 1 : SIGMOID COLON Fresh Colon  Laura Pickard MD 10/18/2018 1546 Pathology Findings: stricture Complications: stapler misfired. Took down the stapled anastomosis and did a hand sewn anastomosis. Implants: * No implants in log *

## 2018-10-18 NOTE — ROUTINE PROCESS
Patient: Parker Martin MRN: [id number]  SSN: [id number] YOB: 1975  Age: [address] y.o. Sex: male Patient is status post Procedure(s): LAPAROSCOPIC SIGMOID RESECTION (E R A S). Surgeon(s) and Role: Genia Duverney, MD - Primary Local/Dose/Irrigation:  
 
             
Peripheral IV 10/18/18 Right Hand (Active) Airway - Endotracheal Tube 10/18/18 Oral (Active) Dressing/Packing:  Wound Abdomen-DRESSING TYPE: Topical skin adhesive/glue (10/18/18 1700) Splint/Cast:  ] Other: gurdeep jurado

## 2018-10-18 NOTE — ANESTHESIA PREPROCEDURE EVALUATION
Anesthetic History No history of anesthetic complications Review of Systems / Medical History Patient summary reviewed, nursing notes reviewed and pertinent labs reviewed Pulmonary Within defined limits Neuro/Psych Within defined limits Cardiovascular Hyperlipidemia GI/Hepatic/Renal 
  
GERD: well controlled Comments: Colon CA Endo/Other Obesity Other Findings Physical Exam 
 
Airway Mallampati: III 
TM Distance: > 6 cm Neck ROM: normal range of motion Mouth opening: Normal 
 
 Cardiovascular Regular rate and rhythm,  S1 and S2 normal,  no murmur, click, rub, or gallop Dental 
No notable dental hx Pulmonary Breath sounds clear to auscultation Abdominal 
GI exam deferred Other Findings Anesthetic Plan ASA: 2 Anesthesia type: general 
 
 
 
 
Induction: Intravenous Anesthetic plan and risks discussed with: Patient

## 2018-10-19 LAB
ANION GAP SERPL CALC-SCNC: 8 MMOL/L (ref 5–15)
BUN SERPL-MCNC: 12 MG/DL (ref 6–20)
BUN/CREAT SERPL: 12 (ref 12–20)
CALCIUM SERPL-MCNC: 8.2 MG/DL (ref 8.5–10.1)
CHLORIDE SERPL-SCNC: 109 MMOL/L (ref 97–108)
CO2 SERPL-SCNC: 24 MMOL/L (ref 21–32)
CREAT SERPL-MCNC: 0.97 MG/DL (ref 0.7–1.3)
ERYTHROCYTE [DISTWIDTH] IN BLOOD BY AUTOMATED COUNT: 13.2 % (ref 11.5–14.5)
GLUCOSE SERPL-MCNC: 109 MG/DL (ref 65–100)
HCT VFR BLD AUTO: 41.1 % (ref 36.6–50.3)
HGB BLD-MCNC: 13.8 G/DL (ref 12.1–17)
MAGNESIUM SERPL-MCNC: 2.3 MG/DL (ref 1.6–2.4)
MCH RBC QN AUTO: 32.1 PG (ref 26–34)
MCHC RBC AUTO-ENTMCNC: 33.6 G/DL (ref 30–36.5)
MCV RBC AUTO: 95.6 FL (ref 80–99)
NRBC # BLD: 0 K/UL (ref 0–0.01)
NRBC BLD-RTO: 0 PER 100 WBC
PHOSPHATE SERPL-MCNC: 2.8 MG/DL (ref 2.6–4.7)
PLATELET # BLD AUTO: 251 K/UL (ref 150–400)
PMV BLD AUTO: 8.9 FL (ref 8.9–12.9)
POTASSIUM SERPL-SCNC: 4 MMOL/L (ref 3.5–5.1)
RBC # BLD AUTO: 4.3 M/UL (ref 4.1–5.7)
SODIUM SERPL-SCNC: 141 MMOL/L (ref 136–145)
WBC # BLD AUTO: 12.6 K/UL (ref 4.1–11.1)

## 2018-10-19 PROCEDURE — 74011250636 HC RX REV CODE- 250/636: Performed by: ANESTHESIOLOGY

## 2018-10-19 PROCEDURE — 36415 COLL VENOUS BLD VENIPUNCTURE: CPT | Performed by: SURGERY

## 2018-10-19 PROCEDURE — 83735 ASSAY OF MAGNESIUM: CPT | Performed by: SURGERY

## 2018-10-19 PROCEDURE — 74011000250 HC RX REV CODE- 250: Performed by: ANESTHESIOLOGY

## 2018-10-19 PROCEDURE — 74011250637 HC RX REV CODE- 250/637: Performed by: SURGERY

## 2018-10-19 PROCEDURE — 85027 COMPLETE CBC AUTOMATED: CPT | Performed by: SURGERY

## 2018-10-19 PROCEDURE — 80048 BASIC METABOLIC PNL TOTAL CA: CPT | Performed by: SURGERY

## 2018-10-19 PROCEDURE — 74011250636 HC RX REV CODE- 250/636: Performed by: SURGERY

## 2018-10-19 PROCEDURE — 93970 EXTREMITY STUDY: CPT

## 2018-10-19 PROCEDURE — 65270000032 HC RM SEMIPRIVATE

## 2018-10-19 PROCEDURE — 84100 ASSAY OF PHOSPHORUS: CPT | Performed by: SURGERY

## 2018-10-19 RX ORDER — KETOROLAC TROMETHAMINE 30 MG/ML
15 INJECTION, SOLUTION INTRAMUSCULAR; INTRAVENOUS EVERY 6 HOURS
Status: COMPLETED | OUTPATIENT
Start: 2018-10-19 | End: 2018-10-20

## 2018-10-19 RX ORDER — CELECOXIB 100 MG/1
100 CAPSULE ORAL 2 TIMES DAILY
Status: DISCONTINUED | OUTPATIENT
Start: 2018-10-20 | End: 2018-10-22 | Stop reason: HOSPADM

## 2018-10-19 RX ADMIN — OXYCODONE HYDROCHLORIDE 5 MG: 5 TABLET ORAL at 11:34

## 2018-10-19 RX ADMIN — ACETAMINOPHEN 1000 MG: 500 TABLET ORAL at 22:44

## 2018-10-19 RX ADMIN — ALVIMOPAN 12 MG: 12 CAPSULE ORAL at 08:50

## 2018-10-19 RX ADMIN — Medication 10 ML: at 18:19

## 2018-10-19 RX ADMIN — KETOROLAC TROMETHAMINE 15 MG: 30 INJECTION, SOLUTION INTRAMUSCULAR; INTRAVENOUS at 22:44

## 2018-10-19 RX ADMIN — ACETAMINOPHEN 1000 MG: 500 TABLET ORAL at 04:36

## 2018-10-19 RX ADMIN — Medication 10 ML: at 22:44

## 2018-10-19 RX ADMIN — ENOXAPARIN SODIUM 40 MG: 40 INJECTION SUBCUTANEOUS at 11:20

## 2018-10-19 RX ADMIN — SODIUM CHLORIDE, SODIUM LACTATE, POTASSIUM CHLORIDE, AND CALCIUM CHLORIDE 75 ML/HR: 600; 310; 30; 20 INJECTION, SOLUTION INTRAVENOUS at 06:25

## 2018-10-19 RX ADMIN — Medication 10 ML: at 13:31

## 2018-10-19 RX ADMIN — KETOROLAC TROMETHAMINE 15 MG: 30 INJECTION, SOLUTION INTRAMUSCULAR; INTRAVENOUS at 16:09

## 2018-10-19 RX ADMIN — LIDOCAINE HYDROCHLORIDE 1 MG/KG/HR: 8 INJECTION, SOLUTION INTRAVENOUS at 16:14

## 2018-10-19 RX ADMIN — ALVIMOPAN 12 MG: 12 CAPSULE ORAL at 18:18

## 2018-10-19 RX ADMIN — ACETAMINOPHEN 1000 MG: 500 TABLET ORAL at 11:20

## 2018-10-19 RX ADMIN — ACETAMINOPHEN 1000 MG: 500 TABLET ORAL at 18:18

## 2018-10-19 NOTE — PROCEDURES
[facility]  *** FINAL REPORT ***    Name: Kaykay Alcocer  MRN: [id number]    Inpatient  : 17 May 1975  HIS Order #: [id number]  [address] [facility] Visit #: [id number]  Date: 19 Oct 2018    TYPE OF TEST: Peripheral Venous Testing    REASON FOR TEST  Pain in limb, Limb swelling    Right Leg:-  Deep venous thrombosis:           No  Superficial venous thrombosis:    No  Deep venous insufficiency:        Not examined  Superficial venous insufficiency: Not examined    Left Leg:-  Deep venous thrombosis:           No  Superficial venous thrombosis:    No  Deep venous insufficiency:        Not examined  Superficial venous insufficiency: Not examined      INTERPRETATION/FINDINGS  PROCEDURE:  Color duplex ultrasound imaging of lower extremity veins. FINDINGS:       Right: The common femoral, deep femoral, femoral, popliteal,  posterior tibial, and great saphenous are patent and without evidence  of thrombus;  each is fully compressible and there is no narrowing of  the flow channel on color Doppler imaging. Phasic flow is observed in   the common femoral vein. Limited visualization of the peroneal  veins. There is an incidental finding of a prominent groin lymph node   measuring 1.6cm x 0.7cm. Left:   The common femoral, deep femoral, femoral, popliteal,  posterior tibial, and great saphenous are patent and without evidence  of thrombus;  each is fully compressible and there is no narrowing of  the flow channel on color Doppler imaging. Phasic flow is observed in   the common femoral vein. Limited visualization of the peroneal  veins. There is an incidental finding of a prominent groin lymph node   measuring 1.6cm x 0.7cm. IMPRESSION:  No evidence of right or left lower extremity vein  thrombosis where visualized. There is an incidental finding of a  prominent groin lymph node bilaterally.     ADDITIONAL COMMENTS    I have personally reviewed the data relevant to the interpretation of  this  study.     TECHNOLOGIST: Hawk Cote  Signed: 10/19/2018 05:47 PM    PHYSICIAN: Dwain Goldman MD  Signed: 10/20/2018 08:18 AM

## 2018-10-19 NOTE — PROGRESS NOTES
Surgery Progress Note Admit Date: 10/18/2018 Subjective:  
 
 Pt complains of pain and left lower leg numbness. Pt states he feels \"restless\" and is having difficulty getting comfortable. His pain is 7/10 and localized to the lower abdomen. Abdominal pain worse with movement and coughing. Pt is able to stand at bedside, but not ambulate 2/2 left leg numbness. Pt reports good PO intake and diet is Regular. He confirms spontaneous voiding after jurado was removed this AM. Pt reports no nausea or vomiting. Pt denies fever or chills. No SOB. No CP. Bowel Movements: pt reports no bowel movements and no flatus no bowel movements or flatus Objective:  
 
Patient Vitals for the past 8 hrs: 
 BP Temp Pulse Resp SpO2 Height Weight 10/19/18 0844 138/87 98.8 °F (37.1 °C) 94 16 92 %    
10/19/18 0623      5' 11\" (1.803 m) 238 lb 14.4 oz (108.4 kg) 10/19 0701 - 10/19 1900 In: -  
Out: 200 [Urine:200] 10/17 1901 - 10/19 0700 In: 2000 [I.V.:2000] Out: 0977 [Urine:3450]ip Physical Exam: 
 
General: alert, cooperative, no distress Cardiac: RRR Lungs: Normal chest wall and respirations. Clear to auscultation. Abdomen: soft, without masses or organomegaly, mild TTP in all 4 quadrants, hypoactive bowel sounds in all 4 quadrants Wounds:clean, dry, no drainage Neuro: alert, oriented x 3, no defects noted in general exam, LLE numbness BTK with decreased sensation Extremities: no edema, redness or tenderness in the calves or thighs CBC:  
Lab Results Component Value Date/Time WBC 12.6 (H) 10/19/2018 04:43 AM  
 RBC 4.30 10/19/2018 04:43 AM  
 HGB 13.8 10/19/2018 04:43 AM  
 HCT 41.1 10/19/2018 04:43 AM  
 PLATELET 501 24/75/9161 04:43 AM  
 
BMP:  
Lab Results Component Value Date/Time  Glucose 109 (H) 10/19/2018 04:43 AM  
 Sodium 141 10/19/2018 04:43 AM  
 Potassium 4.0 10/19/2018 04:43 AM  
 Chloride 109 (H) 10/19/2018 04:43 AM  
 CO2 24 10/19/2018 04:43 AM  
 BUN 12 10/19/2018 04:43 AM  
 Creatinine 0.97 10/19/2018 04:43 AM  
 Calcium 8.2 (L) 10/19/2018 04:43 AM  
 
CMP: 
Lab Results Component Value Date/Time Glucose 109 (H) 10/19/2018 04:43 AM  
 Sodium 141 10/19/2018 04:43 AM  
 Potassium 4.0 10/19/2018 04:43 AM  
 Chloride 109 (H) 10/19/2018 04:43 AM  
 CO2 24 10/19/2018 04:43 AM  
 BUN 12 10/19/2018 04:43 AM  
 Creatinine 0.97 10/19/2018 04:43 AM  
 Calcium 8.2 (L) 10/19/2018 04:43 AM  
 Anion gap 8 10/19/2018 04:43 AM  
 BUN/Creatinine ratio 12 10/19/2018 04:43 AM  
 Alk. phosphatase 58 10/08/2018 03:22 PM  
 Protein, total 6.6 10/08/2018 03:22 PM  
 Albumin 3.5 10/08/2018 03:22 PM  
 Globulin 3.1 10/08/2018 03:22 PM  
 A-G Ratio 1.1 10/08/2018 03:22 PM  
 
 
Radiology review: na 
 
   
Assessment:  
Pt is POD #1 s/p Procedure(s): LAPAROSCOPIC SIGMOID RESECTION (E R A S) Plan:  
Diet: no restrictions Activity: out of bed and ambulate as tolerated given LLE numbness Pain management GI and DVT prophylaxis Meds: no change Labs: labs are reviewed, up to date and normal 
D/C Jurado catheter Antibiotics: none indicated Will schedule neuro consult to assess LLE numbness Further plan per Dr. Neha TAYLOR-S Pt seen and examined independent of PA student Agree with above He awoke from surgery with left LE numbness and parasthesias \"it feels like it is dead\",  Distal motor and vascular intact but sensory diminished at calf, shin, ball and heel of foot It is limiting his ambulation --he is sitting up. From a GI standpoint he is eating well but no bowel function yet, pain is controlled but had some issues with incisional pain Voiding spontaneously after jurado removed His abdomen is soft and incision is clean and dry Will consult Neurology to assess his LLE parasthesia--suspect it is related to positioning/ lithotomy during surgery but Neuro input and evaluation pending. Discussed with Dr. Neha Correa who is covering for Dr. Tejal Babb today CLAUDIA Tubbs

## 2018-10-19 NOTE — CONSULTS
NEUROLOGY  10/19/2018     Consulted by: Gurwinder Koroma MD        Patient ID:  Magalis Shoemaker  [id number]  37 y.o.  1975    Cc: left leg numbness and pain    HPI    43M s/p sigmoid resection for diverticulitis 10/18 with new neurologic symptoms. Neuro was consulted for left leg numbness, tingling, and weakness. He noticed this after surgery while transferring from gurney to bed. His left leg feels pins and needles on the anterior leg and painful tightness in the calf. No symptoms above the knee, in the arms, or CL leg. No radicular back pain. No vision or speech s/s. No HA. Review of Systems   Eyes: Negative for double vision. Gastrointestinal: Positive for abdominal pain. Neurological: Positive for tingling, sensory change and focal weakness. Negative for speech change and headaches. All other systems reviewed and are negative.       Past Medical History:   Diagnosis Date    Acid indigestion     Arthritis     Back pain     Chronic pain     kevin shoulders, knees and back    GERD (gastroesophageal reflux disease)     H/O diverticulitis of colon     Joint pain     and stiffness     Family History   Problem Relation Age of Onset    Cancer Mother         lymph node, skin     Other Mother         diverticulitis    No Known Problems Father     Other Sister         diverticulitis    Anesth Problems Neg Hx      Social History     Socioeconomic History    Marital status: SINGLE     Spouse name: Not on file    Number of children: Not on file    Years of education: Not on file    Highest education level: Not on file   Social Needs    Financial resource strain: Not on file    Food insecurity - worry: Not on file    Food insecurity - inability: Not on file   Survata needs - medical: Not on file   Survata needs - non-medical: Not on file   Occupational History    Not on file   Tobacco Use    Smoking status: Current Every Day Smoker     Packs/day: 0.50     Years: 25.00 Pack years: 12.50    Smokeless tobacco: Never Used   Substance and Sexual Activity    Alcohol use: No    Drug use: Not on file    Sexual activity: Not on file   Other Topics Concern    Not on file   Social History Narrative    Not on file     Current Facility-Administered Medications   Medication Dose Route Frequency    sodium chloride (NS) flush 5-10 mL  5-10 mL IntraVENous Q8H    sodium chloride (NS) flush 5-10 mL  5-10 mL IntraVENous PRN    enoxaparin (LOVENOX) injection 40 mg  40 mg SubCUTAneous Q24H    lactated Ringers infusion  75 mL/hr IntraVENous CONTINUOUS    acetaminophen (TYLENOL) tablet 1,000 mg  1,000 mg Oral Q6H    alvimopan (ENTEREG) capsule 12 mg  12 mg Oral BID    celecoxib (CELEBREX) capsule 100 mg  100 mg Oral BID    naloxone (NARCAN) injection 0.4 mg  0.4 mg IntraVENous PRN    oxyCODONE IR (ROXICODONE) tablet 5 mg  5 mg Oral Q4H PRN    ketorolac (TORADOL) injection 15 mg  15 mg IntraVENous Q6H    lidocaine 8 mg/mL (Xylocaine) infusion  1 mg/kg/hr (Ideal) IntraVENous CONTINUOUS    ondansetron (ZOFRAN ODT) tablet 4 mg  4 mg Oral Q4H PRN    HYDROmorphone (DILAUDID) injection 1 mg  1 mg IntraVENous Q3H PRN     No Known Allergies    Visit Vitals  /87   Pulse 94   Temp 98.8 °F (37.1 °C)   Resp 16   Ht 5' 11\" (1.803 m)   Wt 108.4 kg (238 lb 14.4 oz)   SpO2 92%   BMI 33.32 kg/m²     Physical Exam   Constitutional: He is oriented to person, place, and time. He appears well-developed and well-nourished. Cardiovascular: Normal rate. Pulmonary/Chest: Effort normal.   Neurological: He is oriented to person, place, and time. Reflex Scores:       Patellar reflexes are 3+ on the right side and 3+ on the left side. Achilles reflexes are 2+ on the right side and 2+ on the left side. Skin: Skin is warm and dry. Psychiatric: His behavior is normal.   Vitals reviewed. Neurologic Exam     Mental Status   Oriented to person, place, and time.      Cranial Nerves   Cranial nerves II through XII intact. Motor Exam Left leg: EHL 4/5, DF 4/5  PF 4+, AI/AE 4+, HF 5/5 with some give way 2/2 abd discomfort post op    RLE 5/5    BUE 5/5     Sensory Exam   Sensory deficit distribution on left: common peroneal    Gait, Coordination, and Reflexes     Gait  Gait: (def 2/2 weakness and post op)    Tremor   Resting tremor: absent    Reflexes   Right patellar: 3+  Left patellar: 3+  Right achilles: 2+  Left achilles: 2+           Lab Results   Component Value Date/Time    WBC 12.6 (H) 10/19/2018 04:43 AM    HGB 13.8 10/19/2018 04:43 AM    HCT 41.1 10/19/2018 04:43 AM    PLATELET 986 69/19/8382 04:43 AM    MCV 95.6 10/19/2018 04:43 AM     Lab Results   Component Value Date/Time    Hemoglobin A1c 5.5 10/08/2018 03:22 PM    Glucose 109 (H) 10/19/2018 04:43 AM    Creatinine 0.97 10/19/2018 04:43 AM      No results found for: CHOL, CHOLPOCT, HDL, LDL, LDLC, LDLCPOC, LDLCEXT, TRIGL, TGLPOCT, CHHD, CHHDX  Lab Results   Component Value Date/Time    ALT (SGPT) 32 10/08/2018 03:22 PM    AST (SGOT) 19 10/08/2018 03:22 PM    Alk. phosphatase 58 10/08/2018 03:22 PM    Bilirubin, total 0.3 10/08/2018 03:22 PM    Albumin 3.5 10/08/2018 03:22 PM    Protein, total 6.6 10/08/2018 03:22 PM    INR 1.0 10/08/2018 03:22 PM    Prothrombin time 9.9 10/08/2018 03:22 PM    PLATELET 119 12/70/7189 04:43 AM        CT Results (maximum last 3): Results from [place] encounter on 08/31/18   CT ABD PELV W CONT    Narrative EXAM:  CT ABD PELV W CONT    INDICATION: Lower abdominal pain. Evaluation for Diverticulitis    COMPARISON: None    CONTRAST:  100 mL of Isovue-370. TECHNIQUE:   Following the uneventful intravenous administration of contrast, thin axial  images were obtained through the abdomen and pelvis. Coronal and sagittal  reconstructions were generated. Oral contrast wasadministered.  CT dose reduction  was achieved through use of a standardized protocol tailored for this  examination and automatic exposure control for dose modulation. FINDINGS:   LUNG BASES: Clear. INCIDENTALLY IMAGED HEART AND MEDIASTINUM: Unremarkable. LIVER: No mass or biliary dilatation. Diffuse hypodensity. GALLBLADDER: Unremarkable. SPLEEN: No mass. PANCREAS: No mass or ductal dilatation. ADRENALS: Unremarkable. KIDNEYS: No mass, calculus, or hydronephrosis. STOMACH: Unremarkable. SMALL BOWEL: No dilatation or wall thickening. COLON: Elongation and mild thickening of a 10 cm segment of the sigmoid colon. Associated diverticula. No associated mesenteric inflammatory change. APPENDIX: Unremarkable. Axial image 65  PERITONEUM: No ascites or pneumoperitoneum. RETROPERITONEUM: No lymphadenopathy or aortic aneurysm. REPRODUCTIVE ORGANS: Small prostate  URINARY BLADDER: No mass or calculus. BONES: No destructive bone lesion. ADDITIONAL COMMENTS: N/A      Impression IMPRESSION:  Sigmoid diverticulosis or early diverticulitis. Incidental hepatic steatosis             Assessment and Plan        43M s/p sigmoid resection with subsequent distal LLE paresthesias and weakness suspicious for a peripheral process likely distal sciatic nerve involvement. He has peroneal>tibial nerve involvement. I do not think this is stroke. Low suspicion for disc issue as he denies back pain. PT eval with rehab assessment. No imaging at this time needed. Neurology will check on him again tomorrow. Any stroke s/s (slurred speech, vision changes, new unusual weakness), please activate code SRudy Carrillo DO  NEUROLOGIST  Diplomate ABPN  10/19/2018

## 2018-10-19 NOTE — PROGRESS NOTES
Pt c/o numbness left leg from knee to foot, slow to walk, unable to feel when standing on foot, foot warm pulse palp able to move foot/leg, anesthesia paged will be up to see pt.

## 2018-10-19 NOTE — PROGRESS NOTES
Reason for Admission:   Diverticulitis of colon RRAT Score:  0 Plan for utilizing home health:  No home health services identified at this time. Patient is not homebound. Likelihood of Readmission:  Low Transition of Care Plan:  Return home with family support. Reviewed medical chart; met with the patient at the bedside. Patient's three children under the age of 15years of age live with him. He has a nanny for the children. His PCP is at the [facility] [place]. He gets his prescriptions at [facility]. His nanny will pick him up from the hospital.  Care Management will continue to follow his disposition. TAMY Tubbs Care Management Interventions PCP Verified by CM: Yes 
Palliative Care Criteria Met (RRAT>21 & CHF Dx)?: No 
Mode of Transport at Discharge: (Patient will travel via car at discharge.  ) MyChart Signup: No 
Discharge Durable Medical Equipment: No 
Physical Therapy Consult: No 
Occupational Therapy Consult: No 
Speech Therapy Consult: No 
Current Support Network: Own Home(Patient's three children live with him.  ) Confirm Follow Up Transport: (Patient will travel via car at discharge.  ) Plan discussed with Pt/Family/Caregiver: Yes Freedom of Choice Offered: Yes Discharge Location Discharge Placement: Home with family assistance

## 2018-10-19 NOTE — PROGRESS NOTES
Bedside shift change report given to Linda Lundborg (oncoming nurse) by Rosalinda (offgoing nurse). Report included the following information SBAR.

## 2018-10-19 NOTE — PROGRESS NOTES
Patient complaining of numbness in left foot since he woke up from surgery. Has feeling in great toe. No tingling. Calf discomfort. Has not gotten better or worse since surgery. Pulses palpable, foot and calf warm and without redness or swelling. Repositioned patient. No relief. Attempted to get patient up to walk but unable due to numbness. Lidocaine running at 9.4 mL/ hr. Paged on call.  Per Dr. Adalberto Savage cut lidocaine infusion down to 4.7 mL/ hr. Will continue to monitor patient and update MD in the am.

## 2018-10-19 NOTE — PROGRESS NOTES
This afternoon pt still has numbness to his left calf and now it feels painful, on exam he has new swelling and that calf feels tight Pedal pulses palpable and he is distal motor with FROM Check stat dopplers now bilateral 
Neuro consult is complete, they feel it is due to positioning in OR, PT to evaluate and they will follow, they did not feel imaging needed He has been on lovenox post op but no ambulation due to LLE numbness Dr. Beata Mae is aware, he is covering for Dr. Pato Cline, he will see pt shortly CLAUDIA Meléndez

## 2018-10-19 NOTE — PROGRESS NOTES
Patient's ERAS follow up appointment has been scheduled: Follow-up With  Details  Why  Contact Info Joce Chu NP  On 11/5/2018  ERAS Follow Up: 11/5/2018 Time: 10:40 AM   [id number]  
[address] [facility] [address] [id number] 
[phone] Care Management will continue to follow his disposition.   
TAMY Brewer

## 2018-10-19 NOTE — PROGRESS NOTES
Bedside shift change report given to Kofi Griffin RN (oncoming nurse) by Dvae RN (offgoing nurse). Report included the following information SBAR, Kardex, Intake/Output, MAR and Recent Results.

## 2018-10-20 PROCEDURE — 74011250636 HC RX REV CODE- 250/636: Performed by: ANESTHESIOLOGY

## 2018-10-20 PROCEDURE — 74011250637 HC RX REV CODE- 250/637: Performed by: SURGERY

## 2018-10-20 PROCEDURE — 65270000032 HC RM SEMIPRIVATE

## 2018-10-20 PROCEDURE — 74011250636 HC RX REV CODE- 250/636: Performed by: SURGERY

## 2018-10-20 RX ORDER — PANTOPRAZOLE SODIUM 40 MG/1
40 TABLET, DELAYED RELEASE ORAL
Status: DISCONTINUED | OUTPATIENT
Start: 2018-10-20 | End: 2018-10-22 | Stop reason: HOSPADM

## 2018-10-20 RX ADMIN — Medication 10 ML: at 23:00

## 2018-10-20 RX ADMIN — PANTOPRAZOLE SODIUM 40 MG: 40 TABLET, DELAYED RELEASE ORAL at 13:23

## 2018-10-20 RX ADMIN — ALVIMOPAN 12 MG: 12 CAPSULE ORAL at 17:03

## 2018-10-20 RX ADMIN — KETOROLAC TROMETHAMINE 15 MG: 30 INJECTION, SOLUTION INTRAMUSCULAR; INTRAVENOUS at 10:29

## 2018-10-20 RX ADMIN — ALVIMOPAN 12 MG: 12 CAPSULE ORAL at 09:06

## 2018-10-20 RX ADMIN — Medication 10 ML: at 13:23

## 2018-10-20 RX ADMIN — KETOROLAC TROMETHAMINE 15 MG: 30 INJECTION, SOLUTION INTRAMUSCULAR; INTRAVENOUS at 04:42

## 2018-10-20 RX ADMIN — Medication 10 ML: at 04:42

## 2018-10-20 RX ADMIN — ACETAMINOPHEN 1000 MG: 500 TABLET ORAL at 23:00

## 2018-10-20 RX ADMIN — ENOXAPARIN SODIUM 40 MG: 40 INJECTION SUBCUTANEOUS at 10:30

## 2018-10-20 RX ADMIN — ACETAMINOPHEN 1000 MG: 500 TABLET ORAL at 10:29

## 2018-10-20 RX ADMIN — CELECOXIB 100 MG: 100 CAPSULE ORAL at 17:03

## 2018-10-20 RX ADMIN — ACETAMINOPHEN 1000 MG: 500 TABLET ORAL at 04:40

## 2018-10-20 RX ADMIN — OXYCODONE HYDROCHLORIDE 5 MG: 5 TABLET ORAL at 04:41

## 2018-10-20 RX ADMIN — ACETAMINOPHEN 1000 MG: 500 TABLET ORAL at 17:03

## 2018-10-20 NOTE — PROGRESS NOTES
Bedside shift change report given to Jesus Blanco RN (oncoming nurse) by Moise Holly (offgoing nurse). Report included the following information SBAR.

## 2018-10-20 NOTE — PROGRESS NOTES
Surgery Progress Note Admit Date: 10/18/2018 Subjective: No acute changes overnight. Still with numbness and pain in LLE. He thinks it may be a little better today. He feels bloated and has heartburn. No n/v. Tolerating PO. No flatus or stool yet but feels the urge that something is coming. Ambulating but with some difficulty due to pain in his LLE. Objective:  
 
Patient Vitals for the past 8 hrs: 
 BP Temp Pulse Resp SpO2 Weight 10/20/18 1204 137/75 98.4 °F (36.9 °C) 92 18 94 %   
10/20/18 0800 125/83 97.9 °F (36.6 °C) 87 16 92 %   
10/20/18 0458      227 lb 3.2 oz (103.1 kg) 10/20 0701 - 10/20 1900 In: -  
Out: [address] 10/18 1901 - 10/20 0700 In: 750 [P.O.:450; I.V.:300] Out: Hafsa Mcdonnellumgarten [[id number]]RT Physical Exam: 
 
General: alert, cooperative, no distress Cardiac: RRR Lungs: CTAB Abd: soft, mildly distended, appropriately tender. Hypoactive BS. Wounds:clean, dry, no drainage Neuro: motor function intact in LLE. He has persistent numbness in bottom of foot and posterior lower leg. Extremities: no edema, redness or tenderness in the calves or thighs CBC:  
Lab Results Component Value Date/Time WBC 12.6 (H) 10/19/2018 04:43 AM  
 RBC 4.30 10/19/2018 04:43 AM  
 HGB 13.8 10/19/2018 04:43 AM  
 HCT 41.1 10/19/2018 04:43 AM  
 PLATELET 078 71/77/1225 04:43 AM  
 
BMP:  
Lab Results Component Value Date/Time Glucose 109 (H) 10/19/2018 04:43 AM  
 Sodium 141 10/19/2018 04:43 AM  
 Potassium 4.0 10/19/2018 04:43 AM  
 Chloride 109 (H) 10/19/2018 04:43 AM  
 CO2 24 10/19/2018 04:43 AM  
 BUN 12 10/19/2018 04:43 AM  
 Creatinine 0.97 10/19/2018 04:43 AM  
 Calcium 8.2 (L) 10/19/2018 04:43 AM  
 
CMP: 
Lab Results Component Value Date/Time  Glucose 109 (H) 10/19/2018 04:43 AM  
 Sodium 141 10/19/2018 04:43 AM  
 Potassium 4.0 10/19/2018 04:43 AM  
 Chloride 109 (H) 10/19/2018 04:43 AM  
 CO2 24 10/19/2018 04:43 AM  
 BUN 12 10/19/2018 04:43 AM  
 Creatinine 0.97 10/19/2018 04:43 AM  
 Calcium 8.2 (L) 10/19/2018 04:43 AM  
 Anion gap 8 10/19/2018 04:43 AM  
 BUN/Creatinine ratio 12 10/19/2018 04:43 AM  
 Alk. phosphatase 58 10/08/2018 03:22 PM  
 Protein, total 6.6 10/08/2018 03:22 PM  
 Albumin 3.5 10/08/2018 03:22 PM  
 Globulin 3.1 10/08/2018 03:22 PM  
 A-G Ratio 1.1 10/08/2018 03:22 PM  
 
 
Radiology review: na 
 
   
Assessment:  
Pt is POD #2 s/p Procedure(s): LAPAROSCOPIC SIGMOID RESECTION (E R A S) Plan:  
 
- Continue diet as tolerated. Awaiting return of bowel function - ERAS protocol - Encouraged ambulation. PT evaluation pending - F/u with neurology regarding LLE paresthesia. - Added nexium for GERD Popeye Gomez MD 
10/20/2018 12:46 PM

## 2018-10-20 NOTE — PROGRESS NOTES
Bedside shift change report given to Lavern Soto (oncoming nurse) by Edwina Harper (offgoing nurse). Report included the following information SBAR.

## 2018-10-21 LAB
ANION GAP SERPL CALC-SCNC: 10 MMOL/L (ref 5–15)
BASOPHILS # BLD: 0.1 K/UL (ref 0–0.1)
BASOPHILS NFR BLD: 0 % (ref 0–1)
BUN SERPL-MCNC: 10 MG/DL (ref 6–20)
BUN/CREAT SERPL: 11 (ref 12–20)
CALCIUM SERPL-MCNC: 8.5 MG/DL (ref 8.5–10.1)
CHLORIDE SERPL-SCNC: 108 MMOL/L (ref 97–108)
CO2 SERPL-SCNC: 23 MMOL/L (ref 21–32)
CREAT SERPL-MCNC: 0.87 MG/DL (ref 0.7–1.3)
DIFFERENTIAL METHOD BLD: ABNORMAL
EOSINOPHIL # BLD: 0.2 K/UL (ref 0–0.4)
EOSINOPHIL NFR BLD: 2 % (ref 0–7)
ERYTHROCYTE [DISTWIDTH] IN BLOOD BY AUTOMATED COUNT: 13.2 % (ref 11.5–14.5)
GLUCOSE SERPL-MCNC: 97 MG/DL (ref 65–100)
HCT VFR BLD AUTO: 38.7 % (ref 36.6–50.3)
HGB BLD-MCNC: 13.2 G/DL (ref 12.1–17)
IMM GRANULOCYTES # BLD: 0 K/UL (ref 0–0.04)
IMM GRANULOCYTES NFR BLD AUTO: 0 % (ref 0–0.5)
LYMPHOCYTES # BLD: 2.9 K/UL (ref 0.8–3.5)
LYMPHOCYTES NFR BLD: 26 % (ref 12–49)
MCH RBC QN AUTO: 32.2 PG (ref 26–34)
MCHC RBC AUTO-ENTMCNC: 34.1 G/DL (ref 30–36.5)
MCV RBC AUTO: 94.4 FL (ref 80–99)
MONOCYTES # BLD: 0.7 K/UL (ref 0–1)
MONOCYTES NFR BLD: 7 % (ref 5–13)
NEUTS SEG # BLD: 7.3 K/UL (ref 1.8–8)
NEUTS SEG NFR BLD: 65 % (ref 32–75)
NRBC # BLD: 0 K/UL (ref 0–0.01)
NRBC BLD-RTO: 0 PER 100 WBC
PLATELET # BLD AUTO: 214 K/UL (ref 150–400)
PMV BLD AUTO: 9.5 FL (ref 8.9–12.9)
POTASSIUM SERPL-SCNC: 3.7 MMOL/L (ref 3.5–5.1)
RBC # BLD AUTO: 4.1 M/UL (ref 4.1–5.7)
SODIUM SERPL-SCNC: 141 MMOL/L (ref 136–145)
WBC # BLD AUTO: 11.2 K/UL (ref 4.1–11.1)

## 2018-10-21 PROCEDURE — 85025 COMPLETE CBC W/AUTO DIFF WBC: CPT | Performed by: SURGERY

## 2018-10-21 PROCEDURE — 74011250637 HC RX REV CODE- 250/637: Performed by: SURGERY

## 2018-10-21 PROCEDURE — G8979 MOBILITY GOAL STATUS: HCPCS

## 2018-10-21 PROCEDURE — 74011250636 HC RX REV CODE- 250/636: Performed by: SURGERY

## 2018-10-21 PROCEDURE — 80048 BASIC METABOLIC PNL TOTAL CA: CPT | Performed by: SURGERY

## 2018-10-21 PROCEDURE — 36415 COLL VENOUS BLD VENIPUNCTURE: CPT | Performed by: SURGERY

## 2018-10-21 PROCEDURE — G8980 MOBILITY D/C STATUS: HCPCS

## 2018-10-21 PROCEDURE — 65270000032 HC RM SEMIPRIVATE

## 2018-10-21 PROCEDURE — G8978 MOBILITY CURRENT STATUS: HCPCS

## 2018-10-21 PROCEDURE — 97116 GAIT TRAINING THERAPY: CPT

## 2018-10-21 PROCEDURE — 97161 PT EVAL LOW COMPLEX 20 MIN: CPT

## 2018-10-21 RX ADMIN — Medication 10 ML: at 22:40

## 2018-10-21 RX ADMIN — ALVIMOPAN 12 MG: 12 CAPSULE ORAL at 17:57

## 2018-10-21 RX ADMIN — ACETAMINOPHEN 1000 MG: 500 TABLET ORAL at 05:21

## 2018-10-21 RX ADMIN — ACETAMINOPHEN 1000 MG: 500 TABLET ORAL at 17:56

## 2018-10-21 RX ADMIN — ENOXAPARIN SODIUM 40 MG: 40 INJECTION SUBCUTANEOUS at 11:46

## 2018-10-21 RX ADMIN — Medication 10 ML: at 07:37

## 2018-10-21 RX ADMIN — CELECOXIB 100 MG: 100 CAPSULE ORAL at 09:17

## 2018-10-21 RX ADMIN — ACETAMINOPHEN 1000 MG: 500 TABLET ORAL at 22:39

## 2018-10-21 RX ADMIN — ACETAMINOPHEN 1000 MG: 500 TABLET ORAL at 11:46

## 2018-10-21 RX ADMIN — CELECOXIB 100 MG: 100 CAPSULE ORAL at 17:57

## 2018-10-21 RX ADMIN — PANTOPRAZOLE SODIUM 40 MG: 40 TABLET, DELAYED RELEASE ORAL at 07:35

## 2018-10-21 RX ADMIN — ALVIMOPAN 12 MG: 12 CAPSULE ORAL at 09:17

## 2018-10-21 NOTE — PROGRESS NOTES
Neurology Progress Note NAME: Ghanshyam Morrison :  1975 MRN:  [id number] DATE:  10/21/2018 Assessment:  
 
Principal Problem: 
  Diverticulitis of colon (2018) Active Problems: 
  Diverticulitis (10/18/2018) Pt is a 35yo male admitted 10/18/18 for sigmoid resection for diverticulitis. After surgery he immediately noticed left LE N/T and weakness. He was last seen by Dr. Arsh Lester who obtained h/o pins and needles in anterior leg and tightness in calf. On exam had left leg EHL 4/5, DF 4/5  PF 4+, AI/AE 4+, HF 5/5 and dec sensation in common peroneal distribution. Today exam improved with 5-/5 EHL/DF/PF, 5/5 AI/AE, dec PP on dorsum of foot only. Suspected peripheral process due to positioning Plan:  
-F/u with Dr. Arsh Lester in clinic if sxs persist or worsen 
-Continue PT exercises at home. Subjective:  
Pt reports improvement in sxs. Up walking stairs with PT today and did well. Still has pins and needles feeling in foot. Calf and anterior ankle feel tight. Objective:  
Chart reviewed since last seen Current Facility-Administered Medications Medication Dose Route Frequency  pantoprazole (PROTONIX) tablet 40 mg  40 mg Oral ACB  celecoxib (CELEBREX) capsule 100 mg  100 mg Oral BID  sodium chloride (NS) flush 5-10 mL  5-10 mL IntraVENous Q8H  
 sodium chloride (NS) flush 5-10 mL  5-10 mL IntraVENous PRN  
 enoxaparin (LOVENOX) injection 40 mg  40 mg SubCUTAneous Q24H  
 acetaminophen (TYLENOL) tablet 1,000 mg  1,000 mg Oral Q6H  
 alvimopan (ENTEREG) capsule 12 mg  12 mg Oral BID  naloxone (NARCAN) injection 0.4 mg  0.4 mg IntraVENous PRN  
 oxyCODONE IR (ROXICODONE) tablet 5 mg  5 mg Oral Q4H PRN  
 ondansetron (ZOFRAN ODT) tablet 4 mg  4 mg Oral Q4H PRN  
  HYDROmorphone (DILAUDID) injection 1 mg  1 mg IntraVENous Q3H PRN Visit Vitals BP (!) 135/96 (BP 1 Location: Right arm, BP Patient Position: At rest) Pulse 75 Temp 98.5 °F (36.9 °C) Resp 16 Ht 5' 11\" (1.803 m) Wt 105.7 kg (233 lb 1.6 oz) SpO2 98% BMI 32.51 kg/m² Temp (24hrs), Av.4 °F (36.9 °C), Min:98.2 °F (36.8 °C), Max:98.5 °F (36.9 °C) No intake/output data recorded. 10/19 1901 - 10/21 0700 In: 500 [P.O.:200; I.V.:300] Out: 3800 [Urine:3800] Physical Exam: 
General: Well developed well nourished patient in no apparent distress. Cardiac: Regular rate and rhythm with no murmurs. Extremities: 2+ Radial pulses, no cyanosis or edema Neurological Exam: 
Mental Status: Oriented to time, place and person. Speech and language intact. Attention and fund of knowledge appropriate. Normal recent and remote memory. Cranial Nerves:   Facial movement is symmetric. Motor:  5/5 strength in UE. 5/5 right LE. Left LE with 5-/5 EHL/DF/PF, 5/5 AI/AE/LF/LE/HF. Normal bulk and tone. No PD. No tremors Reflexes:   Deep tendon reflexes 2+ and symmetric. Toes downgoing. Sensory:   Intact to LT and PP except diminished on dorsum of left foot Gait:  Steady gait, antalgic favoring left leg Lab Review Recent Results (from the past 24 hour(s)) CBC WITH AUTOMATED DIFF Collection Time: 10/21/18  2:10 AM  
Result Value Ref Range WBC 11.2 (H) 4.1 - 11.1 K/uL  
 RBC 4.10 4. 10 - 5.70 M/uL  
 HGB 13.2 12.1 - 17.0 g/dL HCT 38.7 36.6 - 50.3 % MCV 94.4 80.0 - 99.0 FL  
 MCH 32.2 26.0 - 34.0 PG  
 MCHC 34.1 30.0 - 36.5 g/dL  
 RDW 13.2 11.5 - 14.5 % PLATELET 129 676 - 885 K/uL MPV 9.5 8.9 - 12.9 FL  
 NRBC 0.0 0  WBC ABSOLUTE NRBC 0.00 0.00 - 0.01 K/uL NEUTROPHILS 65 32 - 75 % LYMPHOCYTES 26 12 - 49 % MONOCYTES 7 5 - 13 % EOSINOPHILS 2 0 - 7 % BASOPHILS 0 0 - 1 % IMMATURE GRANULOCYTES 0 0.0 - 0.5 % ABS. NEUTROPHILS 7.3 1.8 - 8.0 K/UL  
 ABS. LYMPHOCYTES 2.9 0.8 - 3.5 K/UL  
 ABS. MONOCYTES 0.7 0.0 - 1.0 K/UL  
 ABS. EOSINOPHILS 0.2 0.0 - 0.4 K/UL  
 ABS. BASOPHILS 0.1 0.0 - 0.1 K/UL  
 ABS. IMM. GRANS. 0.0 0.00 - 0.04 K/UL  
 DF AUTOMATED METABOLIC PANEL, BASIC Collection Time: 10/21/18  2:10 AM  
Result Value Ref Range Sodium 141 136 - 145 mmol/L Potassium 3.7 3.5 - 5.1 mmol/L Chloride 108 97 - 108 mmol/L  
 CO2 23 21 - 32 mmol/L Anion gap 10 5 - 15 mmol/L Glucose 97 65 - 100 mg/dL BUN 10 6 - 20 MG/DL Creatinine 0.87 0.70 - 1.30 MG/DL  
 BUN/Creatinine ratio 11 (L) 12 - 20 GFR est AA >60 >60 ml/min/1.73m2 GFR est non-AA >60 >60 ml/min/1.73m2 Calcium 8.5 8.5 - 10.1 MG/DL Additional comments: 
I have reviewed the patient's new clinical lab test results. Care Plan discussed with: 
Patient Family RN Care Manager Consultant/Specialist:    
 
Signed: Clarisa Rooney MD

## 2018-10-21 NOTE — PROGRESS NOTES
physical Therapy EVALUATION/DISCHARGE Patient: Italia Cantor (1 y.o. male) Date: 10/21/2018 Primary Diagnosis: DIVERTICULITIS OF COLON Diverticulitis Diverticulitis Procedure(s) (LRB): 
LAPAROSCOPIC SIGMOID RESECTION (E R A S) (N/A) 3 Days Post-Op Precautions:   Fall ASSESSMENT : 
Based on the objective data described below, the patient presents with left lower lateral numbness with pins and needles and limited ankle dorsiflexion s/p laparoscopic sigmoid resection. Patient was concerned that his left lower legs felt numb following surgery and was having difficulty with gait. Patient was seen by Neurology with neurologic exam within functional limits. Patient has been up walking several times in the hallway and was able to ambulate 300 feet this PM with an antalgic gait pattern and slow leida. He ascended and descended 13 stairs with one rail without any knee buckling or loss of balance. Patient tolerated L LE calf stretching and massage X 10 minutes and was educated on calf stretches in bed and in standing. Patient does feel a mild amount of tightness above the knee with stretching. Patient is aware that these new impairments in the L lower leg are most likely a result of positioning during surgery or possibly sciatic in nature. Patient does not need skilled acute care PT but may benefit from Outpatient PT in the future if no improvement in symptoms. Skilled physical therapy is not indicated at this time. PLAN : 
Discharge Recommendations: Outpatient PT Further Equipment Recommendations for Discharge: None SUBJECTIVE:  
Patient stated My left calf feels tight and has pins and needles but I have been up walking laps in the hallway.  OBJECTIVE DATA SUMMARY:  
HISTORY:   
Past Medical History:  
Diagnosis Date  Acid indigestion  Arthritis  Back pain  Chronic pain   
 kevin shoulders, knees and back  GERD (gastroesophageal reflux disease)  H/O diverticulitis of colon  Joint pain   
 and stiffness Past Surgical History:  
Procedure Laterality Date  HX COLONOSCOPY    
 HX WISDOM TEETH EXTRACTION Prior Level of Function/Home Situation: Patient is a single parent of 3 young children. He works full time and was independent prior to admission. Personal factors and/or comorbidities impacting plan of care:  
 
Home Situation Home Environment: Private residence # Steps to Enter: 3 Rails to Enter: Yes One/Two Story Residence: (3 story home with one rail) Interior Rails: Left Lift Chair Available: No 
Living Alone: No(Single parent of 3 young children) Support Systems: Friends \ neighbors, Child(varghese) Patient Expects to be Discharged to[de-identified] Private residence Current DME Used/Available at Home: None EXAMINATION/PRESENTATION/DECISION MAKING:  
Critical Behavior: 
Neurologic State: Alert Orientation Level: Oriented to person Cognition: Appropriate decision making, Follows commands, Appropriate safety awareness Safety/Judgement: Fall prevention, Insight into deficits Hearing: Auditory Auditory Impairment: None Hearing Aids/Status: Does not own Skin:  Abdomen is taut. Range Of Motion: 
AROM: Within functional limits PROM: Within functional limits(except left dorsiflexion to neutral. ) Strength:   
Strength: Within functional limits - no difficulty with toe and heel raises in standing. Tone & Sensation:  
Tone: Normal 
Sensation: Intact(except for left lower lateral leg) intact to light touch but pins and needles and mild sensation impairment peroneal area. Coordination: 
Coordination: Within functional limits Functional Mobility: 
Bed Mobility: 
Rolling: Modified independent Supine to Sit: Modified independent Sit to Supine: Modified independent Scooting: Modified independent Transfers: 
Sit to Stand: Modified independent Stand to Sit: Modified independent Balance:  
Sitting - Static: Good (unsupported) Sitting - Dynamic: Good (unsupported) Standing: Impaired Standing - Static: Good Standing - Dynamic : Fair Ambulation/Gait Training: 
Distance (ft): 250 Feet (ft) Ambulation - Level of Assistance: Supervision Gait Abnormalities: Antalgic;Decreased step clearance Base of Support: Shift to right Stance: Left decreased Speed/Tamra: Slow Step Length: Left shortened No loss of balance or knee buckling Stairs: 
Number of Stairs Trained: (13 with one rail) Stairs - Level of Assistance: Supervision Therapeutic Exercises:  
Standing heel and toe raises, left calf stretch in standing position against wall and in supine with use of gait belt. Massage and passive stretching to left calf and plantar surface of the foot X 10 minutes. Functional Measure: 
Timed up and go: 
 
   
Timed up and go: < l0 seconds Timed Up and Go and G-code impairment scale: 
Percentage of Impairment CH 
 
0% 
 CI 
 
1-19% CJ 
 
20-39% CK 
 
40-59% CL 
 
60-79% CM 
 
80-99% CN  
 
100% Timed Score 0-56 10 11-12 13-14 15-16 17-18 19 20  
 
 
< than 10 seconds=Normal  Greater then 13.5 seconds (in elderly)=Increased fall risk Gabriel BELLO Predicting the probability for falls in community dwelling older adults using the Timed Up and Go Test. Phys Ther. 2000;80:896-903. G codes: In compliance with CMSs Claims Based Outcome Reporting, the following G-code set was chosen for this patient based on their primary functional limitation being treated: The outcome measure chosen to determine the severity of the functional limitation was the Timed up and go tests with a score of < 10 seconds which was correlated with the impairment scale. ? Mobility - Walking and Moving Around:  
  - CURRENT STATUS: CH - 0% impaired, limited or restricted  - GOAL STATUS: CH - 0% impaired, limited or restricted   - D/C STATUS:  ---------------To be determined---------------  
 
  
 Based on the above components, the patient evaluation is determined to be of the following complexity level: LOW Pain: 
Pain Scale 1: Numeric (0 - 10) Pain Intensity 1: 0 Pain Location 1: Neck;Abdomen Activity Tolerance:  
Please refer to the flowsheet for vital signs taken during this treatment. After treatment:  
[]   Patient left in no apparent distress sitting up in chair 
[x]   Patient left in no apparent distress in bed 
[x]   Call bell left within reach [x]   Nursing notified 
[]   Caregiver present 
[]   Bed alarm activated COMMUNICATION/EDUCATION:  
Communication/Collaboration: 
[x]   Fall prevention education was provided and the patient/caregiver indicated understanding. [x]   Patient/family have participated as able and agree with findings and recommendations. []   Patient is unable to participate in plan of care at this time. Findings and recommendations were discussed with: Registered Nurse Thank you for this referral. 
Cindy Goldsmith, PT Time Calculation: 25 mins

## 2018-10-21 NOTE — ROUTINE PROCESS
Patient voiding well and ambulated multiple laps in the bejarano last evening. Reported a BM yesterday. Pain in neck controlled with heat and scheduled tylenol. Bedside shift change report given to [place] Garcia RN (oncoming nurse) by Sania Wiley RN (offgoing nurse). Report included the following information SBAR, Kardex, Procedure Summary, Intake/Output, Accordion and Recent Results.

## 2018-10-21 NOTE — ROUTINE PROCESS
Bedside and Verbal shift change report given to Giovanni Finnegan RN (oncoming nurse) by Edgar South RN (offgoing nurse). Report included the following information SBAR, Kardex, Intake/Output, MAR, Accordion and Recent Results.

## 2018-10-21 NOTE — PROGRESS NOTES
[address] [facility] Surgery POD #3 Subjective Doing well, tolerating diet, pain controlled, LLE pain and numbness improving some Objective Patient Vitals for the past 24 hrs: 
 Temp Pulse Resp BP SpO2  
10/21/18 0817 98.5 °F (36.9 °C) 75 16 (!) 135/96 98 % 10/20/18 2300 98.2 °F (36.8 °C) 95 18 127/84 93 % 10/20/18 2013 98.4 °F (36.9 °C) 98 16 151/84 92 % 10/20/18 1550 98.4 °F (36.9 °C) 93 16 (!) 147/92 94 % Date 10/20/18 0700 - 10/21/18 4031 10/21/18 0700 - 10/22/18 8354 Shift 2890-9574 8326-8370 24 Hour Total 1994-9911 5596-5708 24 Hour Total  
INTAKE Shift Total(mL/kg) OUTPUT Urine(mL/kg/hr) 1420(1.1) 950(0.8) [id number]) Urine Voided 6868 959 5472 Urine Occurrence(s) 3 x  3 x Stool Stool Occurrence(s) 1 x  1 x Shift Total(mL/kg) 9776(91.6) 950(9.2) [id number]) NET -5715 -975 -2747 Weight (kg) 103.1 103.1 103.1 105.7 105.7 105.7 PE 
Pulm - CTAB 
CV - RRR Abd - soft, ND, BS present, NTTP Ext - LLE calf soft, no pain with passive motion, pulses intact Labs Recent Results (from the past 12 hour(s)) CBC WITH AUTOMATED DIFF Collection Time: 10/21/18  2:10 AM  
Result Value Ref Range WBC 11.2 (H) 4.1 - 11.1 K/uL  
 RBC 4.10 4. 10 - 5.70 M/uL  
 HGB 13.2 12.1 - 17.0 g/dL HCT 38.7 36.6 - 50.3 % MCV 94.4 80.0 - 99.0 FL  
 MCH 32.2 26.0 - 34.0 PG  
 MCHC 34.1 30.0 - 36.5 g/dL  
 RDW 13.2 11.5 - 14.5 % PLATELET 838 782 - 613 K/uL MPV 9.5 8.9 - 12.9 FL  
 NRBC 0.0 0  WBC ABSOLUTE NRBC 0.00 0.00 - 0.01 K/uL NEUTROPHILS 65 32 - 75 % LYMPHOCYTES 26 12 - 49 % MONOCYTES 7 5 - 13 % EOSINOPHILS 2 0 - 7 % BASOPHILS 0 0 - 1 % IMMATURE GRANULOCYTES 0 0.0 - 0.5 % ABS. NEUTROPHILS 7.3 1.8 - 8.0 K/UL  
 ABS. LYMPHOCYTES 2.9 0.8 - 3.5 K/UL  
 ABS. MONOCYTES 0.7 0.0 - 1.0 K/UL  
 ABS. EOSINOPHILS 0.2 0.0 - 0.4 K/UL ABS. BASOPHILS 0.1 0.0 - 0.1 K/UL  
 ABS. IMM. GRANS. 0.0 0.00 - 0.04 K/UL  
 DF AUTOMATED METABOLIC PANEL, BASIC Collection Time: 10/21/18  2:10 AM  
Result Value Ref Range Sodium 141 136 - 145 mmol/L Potassium 3.7 3.5 - 5.1 mmol/L Chloride 108 97 - 108 mmol/L  
 CO2 23 21 - 32 mmol/L Anion gap 10 5 - 15 mmol/L Glucose 97 65 - 100 mg/dL BUN 10 6 - 20 MG/DL Creatinine 0.87 0.70 - 1.30 MG/DL  
 BUN/Creatinine ratio 11 (L) 12 - 20 GFR est AA >60 >60 ml/min/1.73m2 GFR est non-AA >60 >60 ml/min/1.73m2 Calcium 8.5 8.5 - 10.1 MG/DL Assessment Job Yaron is a 37 y. o.yr old male s/p laparoscopic sigmoid colectomy Plan Continue diet as tolerated LLE paresthesia seems to be improving, no concerns for compartment syndrome PT evaluation Plan for DC home tomorrow Martell Bassett MD

## 2018-10-22 VITALS
WEIGHT: 233.1 LBS | BODY MASS INDEX: 32.63 KG/M2 | TEMPERATURE: 98.2 F | HEIGHT: 71 IN | RESPIRATION RATE: 16 BRPM | SYSTOLIC BLOOD PRESSURE: 112 MMHG | OXYGEN SATURATION: 92 % | HEART RATE: 77 BPM | DIASTOLIC BLOOD PRESSURE: 65 MMHG

## 2018-10-22 PROCEDURE — 74011250637 HC RX REV CODE- 250/637: Performed by: SURGERY

## 2018-10-22 RX ORDER — OXYCODONE HYDROCHLORIDE 5 MG/1
5 TABLET ORAL
Qty: 20 TAB | Refills: 0 | Status: SHIPPED | OUTPATIENT
Start: 2018-10-22 | End: 2018-12-21 | Stop reason: ALTCHOICE

## 2018-10-22 RX ADMIN — PANTOPRAZOLE SODIUM 40 MG: 40 TABLET, DELAYED RELEASE ORAL at 05:55

## 2018-10-22 RX ADMIN — ALVIMOPAN 12 MG: 12 CAPSULE ORAL at 09:14

## 2018-10-22 RX ADMIN — CELECOXIB 100 MG: 100 CAPSULE ORAL at 09:14

## 2018-10-22 RX ADMIN — ACETAMINOPHEN 1000 MG: 500 TABLET ORAL at 05:55

## 2018-10-22 NOTE — DISCHARGE SUMMARY
Discharge Summary  [facility] General Surgery at [facility]     Patient ID:  Herbert Marrufo  [id number]  78 y.o.  1975    Admit Date: 10/18/2018    Discharge Date: 10/22/2018    Admission Diagnoses: DIVERTICULITIS OF COLON  Diverticulitis  Diverticulitis    Discharge Diagnoses:  Principal Problem:    Diverticulitis of colon (8/8/2018)    Active Problems:    Diverticulitis (10/18/2018)         Admission Condition: Stable    Discharge Condition: Stable    Last Procedure: Procedure(s):  LAPAROSCOPIC SIGMOID RESECTION (E R A S)      Hospital Course:   He recovered well from surgery with return of bowel function and doing well with taking PO. He did have post-operative numbness and tingling in the LLE which was improving over the last 2 days. He will follow-up with us in the office in 10-14 days. Consults: None    Disposition: home    Patient Instructions:   Current Discharge Medication List      START taking these medications    Details   oxyCODONE IR (ROXICODONE) 5 mg immediate release tablet Take 1 Tab by mouth every four (4) hours as needed. Max Daily Amount: 30 mg.  Qty: 20 Tab, Refills: 0    Associated Diagnoses: S/P partial colectomy         CONTINUE these medications which have NOT CHANGED    Details   eszopiclone (LUNESTA) 1 mg tablet Take 1 mg by mouth nightly as needed for Sleep (1-3mg prn hs). ibuprofen (MOTRIN) 800 mg tablet Take 800 mg by mouth every six (6) hours as needed for Pain (800- 1600mg q 8 hrs prn). omeprazole (PRILOSEC) 20 mg capsule Take 20 mg by mouth daily. fenofibrate nanocrystallized (TRICOR) 145 mg tablet Take  by mouth daily.          STOP taking these medications       metroNIDAZOLE (FLAGYL) 500 mg tablet Comments:   Reason for Stopping:         metoclopramide HCl (REGLAN) 10 mg tablet Comments:   Reason for Stopping:         neomycin (MYCIFRADIN) 500 mg tablet Comments:   Reason for Stopping:         PEG 3350-Electrolytes (GO-LYTELY) 236-22.74-6.74 -5.86 gram suspension Comments:   Reason for Stopping:                 Activity: No lifting, pushing or pulling anything heavier than 20 lbs x 1 week. Walking as tolerated. No driving while you are taking narcotics. Diet: Regular Diet. If you have cramps or nausea, try eating smaller light meals more frequently. You may find it helpful to take an over-the-counter stool softener such as colace if you feel constipated. Wound Care: Keep clean and dry. You may shower, but no immersion in standing water (bath tubs, swimming pools) x 1 week. Pat area with soft towel to dry. Follow-up with Dr. Bri Hobson in 10-14 days. Call office at [phone] to schedule appointment. We are located at [facility] in the [facility], [address] [place] [address]. Call office and notify provider [phone] if you develop fever >101, persistent vomiting, or signs of infection at your surgical site including redness, swelling, or drainage that looks like pus or green/brown fluid, or increasing pain that does not improve with pain medication.       Signed:  Alexia Bellamy NP  10/22/2018  10:31 AM

## 2018-10-22 NOTE — DISCHARGE INSTRUCTIONS
[facility]  Discharge Instructions    Patient ID:  Carmen Nicole  [id number]  1 y.o.  1975    Admit Date: 10/18/2018    Discharge Date: 10/22/2018    Last Procedure: Procedure(s):  LAPAROSCOPIC SIGMOID RESECTION (E R A S)      Patient Instructions:       FOLLOW-UP:  Follow-up with Dr. Vanessa Orona in 10-14 days as discussed. Call office at [phone] to schedule an appointment at your convenience. Please arrive by 20 minutes before scheduled appointment time to complete paperwork. Make sure to bring your ID card and insurance information. We are located at [facility] in the [facility], [address] [facility]. Call your physician immediately if you have any fevers greater than 101, drainage from your wound that is not clear or looks infected, persistent bleeding, increasing abdominal pain, problems urinating, or persistent nausea/vomiting. ACTIVITY:    You may want to hug a pillow when coughing and sneezing to add additional support to the surgical area(s) which will decrease pain during these times. You are encouraged to walk and engage in light activity for the next two weeks. You should not lift more than 20 pounds during this time frame as it could put you at increased risk for a post-operative hernia. Twenty pounds is roughly equivalent to a plastic bag of groceries. · Most people are able to return to work within 1 to 2 weeks after surgery. · You may shower 24 hours after surgery. Pat the cut (incision) dry. Do not take a bath for the first week. · No driving while you are taking narcotics. DIET:  You may eat any foods that you can tolerate. You may find it helpful to eat smaller sized, light meals more frequently for the first few days following surgery. It is a good idea to eat a high fiber diet and take in plenty of fluids to prevent constipation.   If you do become constipated you may want to take a mild laxative or take ducolax tablets on a daily basis until your bowel habits are regular. Constipation can be very uncomfortable, along with straining, after recent abdominal surgery. WOUND CARE INSTRUCTIONS:   You may shower at home. Do not soak in a bath tub or swimming pool for one week after surgery. If clothing rubs against the wound or causes irritation and the wound is not draining you may cover it with a dry dressing during the daytime. Try to keep the wound dry and avoid ointments on the wound unless directed to do so. If the wound becomes bright red and painful or starts to drain infected material that is not clear, please contact your physician immediately. You should also call if you begin to drain fluid that is thin and greenish-brown from the wound and appears to look like bile. If the wound though is mildly pink and has a thick firm ridge underneath it, this is normal, and is referred to as a healing ridge. This will resolve over the next 4-6 weeks. MEDICATIONS:  Try to take narcotic medications and anti-inflammatory medications, such as tylenol, ibuprofen, naprosyn, etc., with food. This will minimize stomach upset from the medication. Should you develop nausea and vomiting from the pain medication, or develop a rash, please discontinue the medication and contact your physician. You should not drive, make important decisions, or operate machinery when taking narcotic pain medication. · Take tylenol as needed for pain. Take Roxicodone for back-up pain control when needed. · It is important that you take the medication exactly as they are prescribed. · Keep your medication in the bottles provided by the pharmacist and keep a list of the medication names, dosages, and times to be taken in your wallet. · Do not take other medications without consulting your doctor. QUESTIONS:  Please feel free to call the office ([phone]) if you have any questions, and they will be glad to assist you.     Signed:  Flower Godinez NP  10/22/2018  10:28 AM           Laparoscopic Bowel Resection: What to Expect at [address] have had part of your small or large intestine taken out. You are likely to have pain that comes and goes for the next few days. You may feel like you have the flu. You also may have a low fever and feel tired and nauseated. This is common. You should feel better after 1 to 2 weeks and will probably be back to normal in 2 to 4 weeks. Your bowel movements may not be regular for several weeks. Also, you may have some blood in your stool. This care sheet gives you a general idea about how long it will take for you to recover. But each person recovers at a different pace. Follow the steps below to get better as quickly as possible. How can you care for yourself at home? Activity    · Rest when you feel tired. Getting enough sleep will help you recover.     · Try to walk each day. Start by walking a little more than you did the day before. Bit by bit, increase the amount you walk. Walking boosts blood flow and helps prevent pneumonia and constipation.     · Avoid strenuous activities, such as biking, jogging, weight lifting, or aerobic exercise, until your doctor says it is okay.     · Avoid lifting anything that would make you strain. This may include heavy grocery bags and milk containers, a heavy briefcase or backpack, cat litter or dog food bags, a vacuum , or a child.     · Ask your doctor when you can drive again.     · You will probably need to take 2 to 4 weeks off from work. It depends on the type of work you do and how you feel.     · You may shower 24 to 48 hours after surgery, if your doctor says it is okay. Pat the cut (incision) dry. Do not take a bath for the first 2 weeks, or until your doctor tells you it is okay.     · Ask your doctor when it is okay for you to have sex. Diet    · You may not have much appetite after the surgery. But try to eat a healthy diet.  Your doctor will tell you about any foods you should not eat.     · Eat a low-fiber diet for several weeks after surgery. Eat many small meals throughout the day. Add high-fiber foods a little at a time.     · Eat yogurt. It puts good bacteria into your colon and helps prevent diarrhea.     · Try to avoid nuts, seeds, and corn for a while. They may be hard to digest.     · You may need to take vitamins that contain sodium and potassium. Your doctor will tell you whether you should take any vitamins or supplements.     · Drink plenty of fluids (enough so that your urine is light yellow or clear like water) to prevent dehydration. Choose water and other caffeine-free clear liquids until you feel better. If you have kidney, heart, or liver disease and have to limit fluids, talk with your doctor before you increase the amount of fluids you drink. Medicines    · Your doctor will tell you if and when you can restart your medicines. He or she will also give you instructions about taking any new medicines.     · If you take blood thinners, such as warfarin (Coumadin), clopidogrel (Plavix), or aspirin, be sure to talk to your doctor. He or she will tell you if and when to start taking those medicines again. Make sure that you understand exactly what your doctor wants you to do.     · Take pain medicines exactly as directed. ? If the doctor gave you a prescription medicine for pain, take it as prescribed. ? If you are not taking a prescription pain medicine, ask your doctor if you can take an over-the-counter medicine.     · If your doctor prescribed antibiotics, take them as directed. Do not stop taking them just because you feel better. You need to take the full course of antibiotics.     · You may need to take some medicines in a different form. You will be told whether to crush pills or take a liquid form of the medicine.     · If your doctor gives you a stool softener, take it as directed.    Incision care    · If you have strips of tape on the incisions, leave the tape on for a week or until it falls off.     · Wash the area daily with warm, soapy water and pat it dry. Don't use hydrogen peroxide or alcohol, which can slow healing. You may cover the area with a gauze bandage if it weeps or rubs against clothing. Change the bandage every day. Follow-up care is a key part of your treatment and safety. Be sure to make and go to all appointments, and call your doctor if you are having problems. It's also a good idea to know your test results and keep a list of the medicines you take. When should you call for help? Call 911 anytime you think you may need emergency care. For example, call if:    · You passed out (lost consciousness).     · You are short of breath.    Call your doctor now or seek immediate medical care if:    · You have pain that does not get better after you take pain medicine.     · You cannot pass stool or gas.     · You are sick to your stomach and cannot keep fluids down.     · Bright red blood has soaked through your bandage.     · You have loose stitches, or your incision comes open.     · You have signs of a blood clot in your leg (called a deep vein thrombosis), such as:  ? Pain in your calf, back of the knee, thigh, or groin. ? Redness and swelling in your leg or groin.     · You have signs of infection, such as:  ? Increased pain, swelling, warmth, or redness. ? Red streaks leading from the incision. ? Pus draining from the incision. ? A fever.    Watch closely for any changes in your health, and be sure to contact your doctor if you have any problems. Where can you learn more? Go to http://mohinder-annabelle.info/. Enter H191 in the search box to learn more about \"Laparoscopic Bowel Resection: What to Expect at Home. \"  Current as of: March 28, 2018  Content Version: 11.8  © 5989-5100 [employer].  Care instructions adapted under license by [employer] (which disclaims liability or warranty for this information). If you have questions about a medical condition or this instruction, always ask your healthcare professional. [address] any warranty or liability for your use of this information. Numbness and Tingling: Care Instructions  Your Care Instructions    Many things can cause numbness or tingling. Swelling may put pressure on a nerve. This could cause you to lose feeling or have a pins-and-needles sensation on part of your body. Nerves may be damaged from trauma, toxins, or diseases, such as diabetes or multiple sclerosis (MS). Sometimes, though, the cause is not clear. If there is no clear reason for your symptoms, and you are not having any other symptoms, your doctor may suggest watching and waiting for a while to see if the numbness or tingling goes away on its own. Your doctor may want you to have blood or nerve tests to find the cause of your symptoms. Follow-up care is a key part of your treatment and safety. Be sure to make and go to all appointments, and call your doctor if you are having problems. It's also a good idea to know your test results and keep a list of the medicines you take. How can you care for yourself at home? · If your doctor prescribes medicine, take it exactly as directed. Call your doctor if you think you are having a problem with your medicine. · If you have any swelling, put ice or a cold pack on the area for 10 to 20 minutes at a time. Put a thin cloth between the ice and your skin. When should you call for help? Call 911 anytime you think you may need emergency care. For example, call if:    · You have weakness, numbness, or tingling in both legs.     · You lose bowel or bladder control.     · You have symptoms of a stroke. These may include:  ? Sudden numbness, tingling, weakness, or loss of movement in your face, arm, or leg, especially on only one side of your body. ? Sudden vision changes.   ? Sudden trouble speaking. ? Sudden confusion or trouble understanding simple statements. ? Sudden problems with walking or balance. ? A sudden, severe headache that is different from past headaches.    Watch closely for changes in your health, and be sure to contact your doctor if you have any problems, or if:    · You do not get better as expected. Where can you learn more? Go to http://mohinder-annabelle.info/. Enter J225 in the search box to learn more about \"Numbness and Tingling: Care Instructions. \"  Current as of: September 10, 2017  Content Version: 11.8  © 8391-2605 [employer]. Care instructions adapted under license by [employer] (which disclaims liability or warranty for this information). If you have questions about a medical condition or this instruction, always ask your healthcare professional. [address] any warranty or liability for your use of this information.

## 2018-10-22 NOTE — PROGRESS NOTES
General Surgery Daily Progress Note Admit Date: 10/18/2018 Post-Operative Day: 4 Days Post-Op from Procedure(s): LAPAROSCOPIC SIGMOID RESECTION (E R A S) Subjective:  
 
Last 24 hrs: Patient states that he feels much better today. No nausea or vomiting. Passing flatus and has had 2 BM today that were formed. Has received Tylenol for the minimal pain that he is having. Complains of minimal numbness and tingling in the LLE, but improves more each day. No pain in calf or increased heat. Objective:  
 
Blood pressure 112/65, pulse 77, temperature 98.2 °F (36.8 °C), resp. rate 16, height 5' 11\" (1.803 m), weight 233 lb 1.6 oz (105.7 kg), SpO2 92 %. Temp (24hrs), Av.5 °F (36.9 °C), Min:98.2 °F (36.8 °C), Max:98.9 °F (37.2 °C) 
 
 
_____________________ Physical Exam:    
Alert and Oriented x3 , in no acute distress. Cardiovascular: RRR, no peripheral edema, +2 pulses pedal pulses Lungs:CTAB Abdomen: soft and mildly distended Skin: wounds clean, dry and intact Neuro: motor function intact in a 4 extremities, mild numbness in the LLE  
MSK: no edema, redness or tenderness in the calfs Assessment:  
Principal Problem: 
  Diverticulitis of colon (2018) Active Problems: 
  Diverticulitis (10/18/2018) Plan:  
Diet as tolerated Encourage ambulation Follow up outpatient with neurology Possible Discharge today Data Review: 
 
Recent Labs 10/21/18 
0210 WBC 11.2* HGB 13.2 HCT 38.7  Recent Labs 10/21/18 
0210   
K 3.7  CO2 23 GLU 97 BUN 10  
CREA 0.87 CA 8.5 No results for input(s): AML, LPSE in the last 72 hours. ______________________ Medications: 
 
Current Facility-Administered Medications Medication Dose Route Frequency  pantoprazole (PROTONIX) tablet 40 mg  40 mg Oral ACB  celecoxib (CELEBREX) capsule 100 mg  100 mg Oral BID  sodium chloride (NS) flush 5-10 mL  5-10 mL IntraVENous Q8H  
  sodium chloride (NS) flush 5-10 mL  5-10 mL IntraVENous PRN  
 enoxaparin (LOVENOX) injection 40 mg  40 mg SubCUTAneous Q24H  
 acetaminophen (TYLENOL) tablet 1,000 mg  1,000 mg Oral Q6H  
 alvimopan (ENTEREG) capsule 12 mg  12 mg Oral BID  naloxone (NARCAN) injection 0.4 mg  0.4 mg IntraVENous PRN  
 oxyCODONE IR (ROXICODONE) tablet 5 mg  5 mg Oral Q4H PRN  
 ondansetron (ZOFRAN ODT) tablet 4 mg  4 mg Oral Q4H PRN  
 HYDROmorphone (DILAUDID) injection 1 mg  1 mg IntraVENous Q3H PRN Tammie Maddox 10/22/2018

## 2018-10-22 NOTE — ANESTHESIA POSTPROCEDURE EVALUATION
Procedure(s): LAPAROSCOPIC SIGMOID RESECTION (E R A S). Anesthesia Post Evaluation Anesthetic complications: no 
 
 
 
Visit Vitals /65 Pulse 77 Temp 36.8 °C (98.2 °F) Resp 16 Ht 5' 11\" (1.803 m) Wt 105.7 kg (233 lb 1.6 oz) SpO2 92% BMI 32.51 kg/m²

## 2018-10-22 NOTE — PROGRESS NOTES
Bedside shift change report given to Jn Reddy (oncoming nurse) by Lexis Carvalho (offgoing nurse). Report included the following information SBAR.

## 2018-10-22 NOTE — PROGRESS NOTES
Daily Progress Note [id number] [facility] at [facility] Admit Date: 10/18/2018 Post-Operative Day: 4 from Procedure(s): LAPAROSCOPIC SIGMOID RESECTION (E R A S) Subjective:  
 
Last 24 hrs: Pain well controlled with tylenol. Ambulating frequently. Tolerating PO, + Flatus and + BM. Paresthesia in LLE is improved. Feels ready for discharge. Objective:  
 
Blood pressure 112/65, pulse 77, temperature 98.2 °F (36.8 °C), resp. rate 16, height 5' 11\" (1.803 m), weight 105.7 kg (233 lb 1.6 oz), SpO2 92 %. Temp (24hrs), Av.5 °F (36.9 °C), Min:98.2 °F (36.8 °C), Max:98.9 °F (37.2 °C) 
 
 
_____________________ Physical Exam:    
Alert and Oriented, sitting up on side of bed, no distress. Cardiovascular: RRR, no peripheral edema Lungs:CTAB Abdomen: + BS, soft, NT. Incisions healing well. Assessment:  
Principal Problem: 
  Diverticulitis of colon (2018) Active Problems: 
  Diverticulitis (10/18/2018) s/p laparoscopic sigmoid resection Plan:  
 
Stable for DC Instructions reviewed Rx for roxicodone F/U in office as discussed Umm Barraza [place] [id number] [facility] at [facility] [address] [facility] [address] [place], [place] 
[phone] Data Review: 
 
Recent Labs 10/21/18 
0210 WBC 11.2* HGB 13.2 HCT 38.7  Recent Labs 10/21/18 
0210   
K 3.7  CO2 23 GLU 97 BUN 10  
CREA 0.87 CA 8.5 No results for input(s): AML, LPSE in the last 72 hours. ______________________ Medications: 
 
Current Facility-Administered Medications Medication Dose Route Frequency  pantoprazole (PROTONIX) tablet 40 mg  40 mg Oral ACB  celecoxib (CELEBREX) capsule 100 mg  100 mg Oral BID  sodium chloride (NS) flush 5-10 mL  5-10 mL IntraVENous Q8H  
 sodium chloride (NS) flush 5-10 mL  5-10 mL IntraVENous PRN  
 enoxaparin (LOVENOX) injection 40 mg  40 mg SubCUTAneous Q24H  acetaminophen (TYLENOL) tablet 1,000 mg  1,000 mg Oral Q6H  
 alvimopan (ENTEREG) capsule 12 mg  12 mg Oral BID  naloxone (NARCAN) injection 0.4 mg  0.4 mg IntraVENous PRN  
 oxyCODONE IR (ROXICODONE) tablet 5 mg  5 mg Oral Q4H PRN  
 ondansetron (ZOFRAN ODT) tablet 4 mg  4 mg Oral Q4H PRN  
 HYDROmorphone (DILAUDID) injection 1 mg  1 mg IntraVENous Q3H PRN

## 2018-10-31 NOTE — OP NOTES
[address] [facility] REPORT    Vita Hernández  MR#: [id number]  : 1975  ACCOUNT #: [de-identified]   DATE OF SERVICE: 10/18/2018    PREOPERATIVE DIAGNOSIS:  Multiple attacks of diverticulitis of sigmoid colon. POSTOPERATIVE DIAGNOSIS:  Multiple attacks of diverticulitis of sigmoid colon. PROCEDURE PERFORMED:    1. Laparoscopic sigmoid resection with extracorporeal anastomosis. 2.  Laparoscopic mobilization of the splenic flexure. SURGEON:  Renetta Meyer. Aaliyah Plunkett MD    ASSISTANT:  Monalisa Richard. ANESTHESIA:  General.    ESTIMATED BLOOD LOSS:  Minimal.    SPECIMENS REMOVED:  Sigmoid colon. FINDINGS:  Tight stricture of the sigmoid colon. COMPLICATIONS:  The end-to-end anastomotic stapler failed with staples being applied on 1/2 of the [place] but not on the other. This required taking down that particular anastomosis and hand sewing it. IMPLANTS:  None. CONDITION:  Good to the PACU. DESCRIPTION OF PROCEDURE:  With the patient supine and suitably anesthetized the abdomen was prepared with ChloraPrep and draped as a field. Then 0.5% Marcaine with epinephrine in the appropriate places. A spot in the right lower abdomen was chosen and a 5 mm trocar was placed under direct vision and pneumoperitoneum was established. A 5 mm was exchanged for a 10 mm. Then 5 mm trocars were placed more superiorly in the right lower abdomen and another one in the supraumbilical position and a fourth one over by the left mid abdomen. The patient was placed in Trendelenburg position and the sigmoid colon was  from the abdominal wall fairly easily. I then began to dissect out the mesenteric, sweeping the peritoneal attachments overlying the sigmoid colon and the ascending colon. In so doing I identified the ureter and kept it out kept out of harm's way.   I tracked the dissection superiorly and then dissected the transverse colon by retracting the omentum superiorly and taking the omentum off the colon. Eventually it would [place] around the splenic flexure and bring down the entire flexure on its mesentery. This afforded good mobilization of the colon. I then dissected down laterally on the left hand side to well below the area of the stricture which was above the area at the peritoneal reflection. On the right side I then dissected the mesentery, opening the peritoneum overlying it down to that same place in the distal sigmoid. I then cleared the distal sigmoid and divided it with the stapler. I then went up superiorly and identified a nice place of the very proximal sigmoid lower descending colon which was clear of any diverticula and divided that as well with the stapler. The intervening mesentery was then divided with judicious use of the LigaSure device. Once this was all accomplished I made a small lower midline incision and removed the specimen. I then prepared the proximal bowel by taking it off the staple line, placing the anvil of a 28 EEA stapler in there and then performing an over-and-over pursestring suture with Prolene to close the colon along the stem of the anvil. The stapling device was then introduced through the anus into the rectum and up into the area of the transected bowel. The stem was extended and went right through the center of the staple line. I matched it up with the anvil and closed it carefully to make sure there was nothing in the intervening space. I then fired the gun easily after opening it and it was clearly stuck. What had happened was that several of the staples on the left side just never fired. I wound up having to take the anastomosis down at this point. I was rather reluctant to try another stapler so I did a handsewn 1 layer interrupted 2-0 silk anastomosis with a little bit of difficulty because of the depth of the wound but it came together very nicely.       The area was irrigated copiously with saline. All the lap guides and like were removed and the omentum was placed over everything. The wound was then closed with #1 Vicryl for the fascia. Subcutaneous tissues were irrigated copiously with saline and then closed with Vicryl and skin was approximated with a subcuticular suture followed by surgical glue. At the termination of the procedure sponge, needle and instrument counts were correct. The patient tolerated this well and was brought to the PACU in satisfactory condition. MD Kim Cross / Joanna Wiseman  D: 10/30/2018 14:59     T: 10/30/2018 21:23  JOB #: [id number]

## 2018-11-05 ENCOUNTER — OFFICE VISIT (OUTPATIENT)
Dept: SURGERY | Age: 43
End: 2018-11-05

## 2018-11-05 VITALS
HEIGHT: 71 IN | RESPIRATION RATE: 18 BRPM | DIASTOLIC BLOOD PRESSURE: 90 MMHG | BODY MASS INDEX: 32.2 KG/M2 | OXYGEN SATURATION: 97 % | SYSTOLIC BLOOD PRESSURE: 130 MMHG | WEIGHT: 230 LBS | HEART RATE: 97 BPM | TEMPERATURE: 98.6 F

## 2018-11-05 DIAGNOSIS — Z09 POSTOPERATIVE EXAMINATION: Primary | ICD-10-CM

## 2018-11-05 NOTE — PROGRESS NOTES
Subjective:      Keegan Cleary is a 37 y.o. male presents for postop care 14 days following laparoscopic sigmoid resection with extracorporeal anastomosis and laparoscopic mobilization of the splenic flexure by Dr. Silvino Arshad. Appetite is good. Eating a regular diet without difficulty. Bowel movements are regular. Has some discomfort, not pain, as he feels the stool move through his the colon prior to a BM as well as some discomfort in the rectal area after the BM. BMs are soft, has to strain a little with BM. The patient is voiding without difficulty. Pain is controlled without oxycodone at night and tylenol during the day. Questions about diet for diverticulitis. Has family of diverticulitis recurring even after colectomy. Advance directive not on file      Objective:     Visit Vitals  /90   Pulse 97   Temp 98.6 °F (37 °C) (Oral)   Resp 18   Ht 5' 11\" (1.803 m)   Wt 230 lb (104.3 kg)   SpO2 97%   BMI 32.08 kg/m²       General:  alert, cooperative, no distress, appears stated age   Abdomen: soft, bowel sounds active, non-tender   Incision:   healing well, no drainage, no erythema, no hernia, no swelling, appropriate post op incisional swelling, no dehiscence, incision well approximated   HR: RRR  Lungs: CTAB        Assessment:     Doing well postoperatively. Plan:     1. Continue any current medications. 2. Wound care discussed. 3. Pt is to increase activities as tolerated. May lift 20 lbs today, 30 lbs next week if no pain  4/ F/U in 1 month  5. Wean off of oxycodone, may also use ibuprofen instead  6. Stools - take miralax daily to help keep stool soft and to pass BMs easily.   7. Diet - per Dr. Mariana Kelly for diverticulitis management

## 2018-11-05 NOTE — PROGRESS NOTES
1. Have you been to the ER, urgent care clinic since your last visit? Hospitalized since your last visit? No    2. Have you seen or consulted any other health care providers outside of the [address] [place] [address] since your last visit? Include any pap smears or colon screening.  No

## 2018-11-05 NOTE — PATIENT INSTRUCTIONS
Drink at least 8 cups of water per day. Avoid bananas and dairy products. Prunes, prune juice and pineapples are helpful to relieve constipation. Also may take Colace over-the-counter and take as directed until relief. If needed, may take Miralax over the counter and use as directed. Open Bowel Resection: What to Expect at [address] [place] [address] are likely to have pain that comes and goes for the next few days after bowel surgery. You may have bowel cramps, and your cut (incision) may hurt. You may also feel like you have the flu. You may have a low fever and feel tired and nauseated. This is common. You should feel better after a week and will probably be back to normal in 2 to 3 weeks. This care sheet gives you a general idea about how long it will take for you to recover. But each person recovers at a different pace. Follow the steps below to get better as quickly as possible. How can you care for yourself at home? Activity    · Rest when you feel tired. Getting enough sleep will help you recover.     · Try to walk each day. Start by walking a little more than you did the day before. Bit by bit, increase the amount you walk. Walking boosts blood flow and helps prevent pneumonia and constipation.     · Avoid strenuous activities, such as biking, jogging, weight lifting, or aerobic exercise, until your doctor says it is okay.     · Ask your doctor when you can drive again.     · You will probably need to take 3 to 4 weeks off from work. It depends on the type of work you do and how you feel. You may need to take off 4 to 6 weeks if you lift heavy objects in your job.     · You may shower 24 to 48 hours after surgery, if your doctor says it is okay. Pat the cut (incision) dry. Do not take a bath for the first 2 weeks, or until your doctor tells you it is okay.     · Ask your doctor when it is okay for you to have sex. Diet    · You may not have much appetite after the surgery.  But try to eat a healthy diet. Your doctor will tell you about any foods you should not eat.     · Eat a low-fiber diet for several weeks after surgery. Eat many small meals throughout the day. Add high-fiber foods a little at a time.     · Eat yogurt. It puts good bacteria into your colon and helps prevent diarrhea.     · Try to avoid nuts, seeds, and corn for a while. They may be hard to digest.     · You may need to take vitamins that contain sodium and potassium. Ask your doctor.     · Drink plenty of fluids to avoid becoming dehydrated. Medicines    · Your doctor will tell you if and when you can restart your medicines. He or she will also give you instructions about taking any new medicines.     · If you take blood thinners, such as warfarin (Coumadin), clopidogrel (Plavix), or aspirin, be sure to talk to your doctor. He or she will tell you if and when to start taking those medicines again. Make sure that you understand exactly what your doctor wants you to do.     · Take pain medicines exactly as directed. ? If the doctor gave you a prescription medicine for pain, take it as prescribed. ? If you are not taking a prescription pain medicine, ask your doctor if you can take an over-the-counter medicine. ? Do not take two or more pain medicines at the same time unless the doctor told you to. Many pain medicines have acetaminophen, which is Tylenol. Too much acetaminophen (Tylenol) can be harmful.     · If you think your pain medicine is making you sick to your stomach:  ? Take your medicine after meals (unless your doctor tells you not to). ? Ask your doctor for a different pain medicine.     · If your doctor prescribed antibiotics, take them as directed. Do not stop taking them just because you feel better. You need to take the full course of antibiotics.     · You may need to take some medicines in a different form.  You will be told whether to crush pills or take a liquid form of the medicine.     · If your doctor gives you a stool softener, take it as directed. Incision care    · If you have strips of tape on the incision, leave the tape on for a week or until it falls off.     · Wash the area daily with warm, soapy water, and pat it dry. Follow-up care is a key part of your treatment and safety. Be sure to make and go to all appointments, and call your doctor if you are having problems. It's also a good idea to know your test results and keep a list of the medicines you take. When should you call for help? Call 911 anytime you think you may need emergency care. For example, call if:    · You passed out (lost consciousness).     · You are short of breath.    Call your doctor now or seek immediate medical care if:    · You are sick to your stomach and cannot drink fluids or keep them down.     · You have signs of a blood clot in your leg (called a deep vein thrombosis), such as:  ? Pain in your calf, back of the knee, thigh, or groin. ? Redness and swelling in your leg or groin.     · You have signs of infection, such as:  ? Increased pain, swelling, warmth, or redness. ? Red streaks leading from the incision. ? Pus draining from the incision. ? A fever.     · You have pain that does not get better after you take pain medicine.     · You have loose stitches, or your incision comes open.     · Bright red blood has soaked through the bandage.     · You cannot pass stools or gas.    Watch closely for any changes in your health, and be sure to contact your doctor if you have any problems. Where can you learn more? Go to http://mohinder-annabelle.info/. Enter [id number] [id number] in the search box to learn more about \"Open Bowel Resection: What to Expect at Home. \"  Current as of: March 28, 2018  Content Version: 11.8  © 1412-1986 [employer]. Care instructions adapted under license by [facility] (which disclaims liability or warranty for this information).  If you have questions about a medical condition or this instruction, always ask your healthcare professional. [place] [address] any warranty or liability for your use of this information.

## 2018-12-07 ENCOUNTER — OFFICE VISIT (OUTPATIENT)
Dept: SURGERY | Age: 43
End: 2018-12-07

## 2018-12-07 VITALS
HEIGHT: 71 IN | BODY MASS INDEX: 34.44 KG/M2 | SYSTOLIC BLOOD PRESSURE: 123 MMHG | WEIGHT: 246 LBS | RESPIRATION RATE: 18 BRPM | DIASTOLIC BLOOD PRESSURE: 79 MMHG | TEMPERATURE: 98.3 F | HEART RATE: 87 BPM | OXYGEN SATURATION: 97 %

## 2018-12-07 DIAGNOSIS — Z09 FOLLOW-UP EXAMINATION AFTER ABDOMINAL SURGERY: Primary | ICD-10-CM

## 2018-12-07 NOTE — PATIENT INSTRUCTIONS
Check with Dr. Maria M Aguilar about testing for celiac     Activity as desired and no restrictions     Ok to proceed with shoulder surgery next month

## 2018-12-07 NOTE — PROGRESS NOTES
1. Have you been to the ER, urgent care clinic since your last visit? Hospitalized since your last visit? No    2. Have you seen or consulted any other health care providers outside of the [address] [place] [address] since your last visit? Include any pap smears or colon screening.  No

## 2018-12-21 NOTE — PROGRESS NOTES
HISTORY OF PRESENT ILLNESS  Madison Churchill is a 37 y.o. male. Patient presents with: Follow-up: 7 weeks post Laparoscopic sigmoid resection with extracorporeal anastomosis and Laparoscopic mobilization of the splenic flexure  Dx: recurrent diverticulitis   He is doing well and no complaints   Asking about testing for Celiac as his mother was recently diagnosed   Asking about having shoulder surgery if \"ok to proceed\"            Review of Systems   Constitutional: Negative for chills, fever, malaise/fatigue and weight loss. Respiratory: Negative for cough and shortness of breath. Cardiovascular: Negative for chest pain, palpitations and leg swelling. Gastrointestinal: Negative for abdominal pain, blood in stool, constipation, diarrhea, heartburn, nausea and vomiting. Diet is good and appetite returned   \"no issues and everything is working\"   Genitourinary: Negative for dysuria. Neurological: Negative for dizziness. Physical Exam   Constitutional: He is oriented to person, place, and time. No distress. /79 (BP 1 Location: Left arm, BP Patient Position: Sitting)   Pulse 87   Temp 98.3 °F (36.8 °C) (Oral)   Resp 18   Ht 5' 11\" (1.803 m)   Wt 246 lb (111.6 kg)   SpO2 97%   BMI 34.31 kg/m²   Looks well    Cardiovascular: Normal rate and regular rhythm. Pulmonary/Chest: Effort normal and breath sounds normal.   Abdominal: Soft. Bowel sounds are normal. There is no tenderness. Well healed    Musculoskeletal: Normal range of motion. He exhibits no edema. Neurological: He is alert and oriented to person, place, and time. Skin: Skin is warm and dry. He is not diaphoretic. Psychiatric: He has a normal mood and affect. ASSESSMENT and PLAN    ICD-10-CM ICD-9-CM    1.  Follow-up examination after abdominal surgery Z09 V67.09      Doing well s/p sigmoid resection for treatment of recurrent diverticulitis   Pathology reviewed   Can follow up with GI about celiac testing   [id number] [address] to proceed with shoulder surgery next month   Diet as tolerated   Activity as desired no restriction   Discharged from surgical care with prn follow up   [address] verbalized understanding and questions were answered to the best of my knowledge and ability. Healthy lifestyle  educational materials were provided.
